# Patient Record
Sex: FEMALE | Race: BLACK OR AFRICAN AMERICAN | NOT HISPANIC OR LATINO | Employment: PART TIME | URBAN - METROPOLITAN AREA
[De-identification: names, ages, dates, MRNs, and addresses within clinical notes are randomized per-mention and may not be internally consistent; named-entity substitution may affect disease eponyms.]

---

## 2017-06-08 ENCOUNTER — OFFICE VISIT (OUTPATIENT)
Dept: FAMILY MEDICINE | Facility: CLINIC | Age: 42
End: 2017-06-08
Payer: COMMERCIAL

## 2017-06-08 VITALS — TEMPERATURE: 98.5 F | DIASTOLIC BLOOD PRESSURE: 69 MMHG | HEART RATE: 80 BPM | SYSTOLIC BLOOD PRESSURE: 103 MMHG

## 2017-06-08 DIAGNOSIS — Z71.84 TRAVEL ADVICE ENCOUNTER: Primary | ICD-10-CM

## 2017-06-08 DIAGNOSIS — Z23 NEED FOR VACCINATION: ICD-10-CM

## 2017-06-08 PROCEDURE — 99402 PREV MED CNSL INDIV APPRX 30: CPT | Mod: 25 | Performed by: NURSE PRACTITIONER

## 2017-06-08 PROCEDURE — 90471 IMMUNIZATION ADMIN: CPT | Mod: GA | Performed by: NURSE PRACTITIONER

## 2017-06-08 PROCEDURE — 90691 TYPHOID VACCINE IM: CPT | Mod: GA | Performed by: NURSE PRACTITIONER

## 2017-06-08 RX ORDER — AZITHROMYCIN 500 MG/1
500 TABLET, FILM COATED ORAL DAILY
Qty: 3 TABLET | Refills: 0 | Status: SHIPPED | OUTPATIENT
Start: 2017-06-08 | End: 2017-06-11

## 2017-06-08 RX ORDER — ATOVAQUONE AND PROGUANIL HYDROCHLORIDE 250; 100 MG/1; MG/1
1 TABLET, FILM COATED ORAL DAILY
Qty: 80 TABLET | Refills: 0 | Status: SHIPPED | OUTPATIENT
Start: 2017-06-08 | End: 2017-10-11

## 2017-06-08 NOTE — MR AVS SNAPSHOT
"              After Visit Summary   6/8/2017    Nila Agarwal    MRN: [id number]           Patient Information     Date Of Birth          1975        Visit Information        Provider Department      6/8/2017 2:00 PM Liliana Layton APRN CNP [facility]        Todays Diagnoses     Travel advice encounter    -  1    Need for vaccination          Care Instructions    Today June 8, 2017 you received the    Typhoid - injectable. This vaccine is valid for two years.   .    These appointments can be made as a NURSE ONLY visit.    **It is very important for the vaccinations to be given on the scheduled day(s), this helps ensure you receive the full effectiveness of the vaccine.**    Please call [facility] with any questions [phone]    Thank you for visiting [facility]              Follow-ups after your visit        Who to contact     If you have questions or need follow up information about today's clinic visit or your schedule please contact [facility] directly at [phone].  Normal or non-critical lab and imaging results will be communicated to you by Snapversehart, letter or phone within 4 business days after the clinic has received the results. If you do not hear from us within 7 days, please contact the clinic through Snapversehart or phone. If you have a critical or abnormal lab result, we will notify you by phone as soon as possible.  Submit refill requests through ImageBrief or call your pharmacy and they will forward the refill request to us. Please allow 3 business days for your refill to be completed.          Additional Information About Your Visit        Snapversehart Information     ImageBrief lets you send messages to your doctor, view your test results, renew your prescriptions, schedule appointments and more. To sign up, go to [email or web address] . Click on \"Log in\" on the left side of the screen, which will take you to the Welcome page. " "Then click on \"Sign up Now\" on the right side of the page.     You will be asked to enter the access code listed below, as well as some personal information. Please follow the directions to create your username and password.     Your access code is: [email or web address]  Expires: 2017  2:33 PM     Your access code will  in 90 days. If you need help or a new code, please call your [facility] clinic or [phone].        Care EveryWhere ID     This is your Care EveryWhere ID. This could be used by other organizations to access your [facility] medical records  [id number]        Your Vitals Were     Pulse Temperature Last Period Breastfeeding?          80 98.5  F (36.9  C) (Oral) 2017 (Approximate) No         Blood Pressure from Last 3 Encounters:   17 103/69   16 111/77   11/04/15 94/66    Weight from Last 3 Encounters:   16 131 lb (59.4 kg)   11/04/15 142 lb 3.2 oz (64.5 kg)   06/05/15 144 lb (65.3 kg)              We Performed the Following     TYPHOID VACCINE, IM          Today's Medication Changes          These changes are accurate as of: 17  2:33 PM.  If you have any questions, ask your nurse or doctor.               Start taking these medicines.        Dose/Directions    atovaquone-proguanil 250-100 MG per tablet   Commonly known as:  MALARONE   Used for:  Need for vaccination, Travel advice encounter   Started by:  Liliana Layton APRN CNP        Dose:  1 tablet   Take 1 tablet by mouth daily Start 2 days before exposure to Malaria and continue daily till  7 days after exposure.   Quantity:  80 tablet   Refills:  0       azithromycin 500 MG tablet   Commonly known as:  ZITHROMAX   Used for:  Travel advice encounter   Started by:  Liliana Layton APRN CNP        Dose:  500 mg   Take 1 tablet (500 mg) by mouth daily for 3 doses Take 1 tablet a day for up to 3 days for severe diarrhea   Quantity:  3 tablet   Refills:  0            Where to get your medicines      These " medications were sent to [id number] - [place], [place] - [address] [facility] [address] [facility]  [address] [facility] [address]     Phone:  [phone]     atovaquone-proguanil 250-100 MG per tablet    azithromycin 500 MG tablet                Primary Care Provider Office Phone # Fax #    Hailey Amparo Barragan [phone]       [place] [address] [facility] [address]        Thank you!     Thank you for choosing [facility]  for your care. Our goal is always to provide you with excellent care. Hearing back from our patients is one way we can continue to improve our services. Please take a few minutes to complete the written survey that you may receive in the mail after your visit with us. Thank you!             Your Updated Medication List - Protect others around you: Learn how to safely use, store and throw away your medicines at www.disposemymeds.org.          This list is accurate as of: 6/8/17  2:33 PM.  Always use your most recent med list.                   Brand Name Dispense Instructions for use    atovaquone-proguanil 250-100 MG per tablet    MALARONE    80 tablet    Take 1 tablet by mouth daily Start 2 days before exposure to Malaria and continue daily till  7 days after exposure.       azithromycin 500 MG tablet    ZITHROMAX    3 tablet    Take 1 tablet (500 mg) by mouth daily for 3 doses Take 1 tablet a day for up to 3 days for severe diarrhea       cetirizine 10 MG tablet    zyrTEC    30 tablet    Take 1 tablet (10 mg) by mouth every evening       fluticasone 50 MCG/ACT spray    FLONASE    16 g    Spray 1-2 sprays into both nostrils daily       ibuprofen 200 MG capsule     120 capsule    Take 400 mg by mouth every 6 hours as needed for pain or fever.       multivitamin, therapeutic with minerals Tabs tablet     30 tablet    Take 1 tablet by mouth daily       vitamin D 2000 UNITS tablet     30 tablet    Take 2,000 Units  by mouth daily

## 2017-06-08 NOTE — PROGRESS NOTES
Nurse Note      Itinerary:  [facility]       Departure Date: 06/16/2017      Return Date: 08/24/2017      Length of Trip 2 months 14 days      Reason for Travel: Vacation            Urban or rural: urban      Accommodations: Family home        IMMUNIZATION HISTORY  Have you received any immunizations within the past 4 weeks?  No  Have you ever fainted from having your blood drawn or from an injection?  No  Have you ever had a fever reaction to vaccination?  No  Have you ever had any bad reaction or side effect from any vaccination?  No  Have you ever had hepatitis A or B vaccine?  Yes  Do you live (or work closely) with anyone who has AIDS, an AIDS-like condition, any other immune disorder or who is on chemotherapy for cancer?  No  Do you have a family history of immunodeficiency?  No  Have you received any injection of immune globulin or any blood products during the past 12 months?  No    Patient roomed by CHARLOTTE Klein  Nila Agarwal is a 42 year old female seen today with child for counsultation for international travel to [facility] for Visiting friends and relatives.  Patient will be departing in  1 week(s) and staying for   2 month(s) and  traveling with child(don).      Patient itinerary :  will be in the [facility] [place] [facility] which presents risk for Malaria, Dengue Fever, Chikungungya, Zika,  Trypanosomiasis, Schistosomiasis, Rabies, food borne illnesses, motor vehicle accidents, Typhoid, Leishmaniasis and Lassa Fever. exposure.      Patient's activities will include visiting friends and relatives.    Patient's country of birth is [facility]    Special medical concerns: none  Pre-travel questionnaire was completed by patient and reviewed by provider.     Vitals: /69  Pulse 80  Temp 98.5  F (36.9  C) (Oral)  LMP 06/01/2017 (Approximate)  Breastfeeding? No  BMI= There is no height or weight on file to calculate BMI.    EXAM:  General:  Well-nourished, well-developed in no  acute distress.  Appears to be stated age, interacts appropriately and expresses understanding of information given to patient.    Current Outpatient Prescriptions   Medication Sig Dispense Refill     atovaquone-proguanil (MALARONE) 250-100 MG per tablet Take 1 tablet by mouth daily Start 2 days before exposure to Malaria and continue daily till  7 days after exposure. 80 tablet 0     fluticasone (FLONASE) 50 MCG/ACT nasal spray Spray 1-2 sprays into both nostrils daily 16 g 0     multivitamin, therapeutic with minerals (MULTI-VITAMIN) TABS Take 1 tablet by mouth daily 30 tablet 0     Cholecalciferol (VITAMIN D) 2000 UNITS tablet Take 2,000 Units by mouth daily 30 tablet 0     cetirizine (ZYRTEC) 10 MG tablet Take 1 tablet (10 mg) by mouth every evening 30 tablet 2     ibuprofen 200 MG capsule Take 400 mg by mouth every 6 hours as needed for pain or fever. 120 capsule 2     Patient Active Problem List   Diagnosis     CARDIOVASCULAR SCREENING; LDL GOAL LESS THAN 160     Chronic rhinitis     External hemorrhoids with other complication     Fatigue     Vitamin D deficiency     Thyroid nodule     Neutropenia (H)     Allergies   Allergen Reactions     No Known Allergies      Seasonal Allergies          Immunizations discussed include:   Hepatitis A:  Declined  Not concerned about risk of disease  Hepatitis B: Up to date  Influenza: Declined  Not concerned about risk of disease  Typhoid: Ordered/given today, risks, benefits and side effects reviewed  Rabies: Insufficient time to vaccinate  Yellow Fever: Not indicated  Japanese Encephalitis: Not indicated  Meningococcus: Not indicated  Tetanus/Diphtheria: Up to date  Measles/Mumps/Rubella: Up to date  Cholera: Not needed  Polio: Up to date  Pneumococcal: Under age of 65  Varicella: Up to date  Zostavax:  Not indicated  HPV:  Not indicated  TB:  Low risk     Altitude Exposure on this trip: no    ASSESSMENT/PLAN:    ICD-10-CM    1. Travel advice encounter Z71.89 TYPHOID  VACCINE, IM     azithromycin (ZITHROMAX) 500 MG tablet     atovaquone-proguanil (MALARONE) 250-100 MG per tablet   2. Need for vaccination Z23 TYPHOID VACCINE, IM     atovaquone-proguanil (MALARONE) 250-100 MG per tablet     I have reviewed general recommendations for safe travel   including: food/water precautions, insect precautions, safer sex   practices given high prevalence of Zika, HIV and other STDs,   roadway safety. Educational materials and Travax report provided.    Malaraia prophylaxis recommended: Malarone  Symptomatic treatment for traveler's diarrhea: azithromycin  Altitude illness prevention and treatment: no      Evacuation insurance advised and resources were provided to patient.    Total visit time 30 minutes  with over 50% of time spent counseling patient as detailed above.    Liliana Layton CNP

## 2017-06-08 NOTE — PATIENT INSTRUCTIONS
Today June 8, 2017 you received the    Typhoid - injectable. This vaccine is valid for two years.   .    These appointments can be made as a NURSE ONLY visit.    **It is very important for the vaccinations to be given on the scheduled day(s), this helps ensure you receive the full effectiveness of the vaccine.**    Please call [facility] with any questions [phone]    Thank you for visiting [facility]

## 2017-10-11 ENCOUNTER — OFFICE VISIT (OUTPATIENT)
Dept: FAMILY MEDICINE | Facility: CLINIC | Age: 42
End: 2017-10-11
Payer: COMMERCIAL

## 2017-10-11 VITALS
RESPIRATION RATE: 16 BRPM | WEIGHT: 135 LBS | SYSTOLIC BLOOD PRESSURE: 110 MMHG | BODY MASS INDEX: 21.79 KG/M2 | HEART RATE: 74 BPM | OXYGEN SATURATION: 100 % | TEMPERATURE: 98.9 F | DIASTOLIC BLOOD PRESSURE: 68 MMHG

## 2017-10-11 DIAGNOSIS — J30.1 CHRONIC SEASONAL ALLERGIC RHINITIS DUE TO POLLEN: ICD-10-CM

## 2017-10-11 PROCEDURE — 99213 OFFICE O/P EST LOW 20 MIN: CPT | Performed by: FAMILY MEDICINE

## 2017-10-11 RX ORDER — FLUTICASONE PROPIONATE 50 MCG
1-2 SPRAY, SUSPENSION (ML) NASAL DAILY
Qty: 16 G | Refills: 3 | Status: SHIPPED | OUTPATIENT
Start: 2017-10-11 | End: 2019-04-10

## 2017-10-11 NOTE — NURSING NOTE
"Chief Complaint   Patient presents with     Allergies       Initial /68  Pulse 74  Temp 98.9  F (37.2  C) (Tympanic)  Resp 16  Wt 135 lb (61.2 kg)  SpO2 100%  BMI 21.79 kg/m2 Estimated body mass index is 21.79 kg/(m^2) as calculated from the following:    Height as of 6/13/16: 5' 6\" (1.676 m).    Weight as of this encounter: 135 lb (61.2 kg).  Medication Reconciliation: complete   .Meagan PORTER      "

## 2017-10-11 NOTE — MR AVS SNAPSHOT
"              After Visit Summary   10/11/2017    Nila Agarwal    MRN: [id number]           Patient Information     Date Of Birth          1975        Visit Information        Provider Department      10/11/2017 11:20 AM Hailey Barragan MD [facility]        Today's Diagnoses     Chronic seasonal allergic rhinitis due to pollen           Follow-ups after your visit        Who to contact     If you have questions or need follow up information about today's clinic visit or your schedule please contact [facility] directly at [phone].  Normal or non-critical lab and imaging results will be communicated to you by 121casthart, letter or phone within 4 business days after the clinic has received the results. If you do not hear from us within 7 days, please contact the clinic through 121casthart or phone. If you have a critical or abnormal lab result, we will notify you by phone as soon as possible.  Submit refill requests through DianDian or call your pharmacy and they will forward the refill request to us. Please allow 3 business days for your refill to be completed.          Additional Information About Your Visit        MyChart Information     DianDian lets you send messages to your doctor, view your test results, renew your prescriptions, schedule appointments and more. To sign up, go to [email or web address] . Click on \"Log in\" on the left side of the screen, which will take you to the Welcome page. Then click on \"Sign up Now\" on the right side of the page.     You will be asked to enter the access code listed below, as well as some personal information. Please follow the directions to create your username and password.     Your access code is: [email or web address]  Expires: 1/15/2018 12:28 PM     Your access code will  in 90 days. If you need help or a new code, please call your [facility] or [phone].        Care EveryWhere ID     " This is your Care EveryWhere ID. This could be used by other organizations to access your [facility] medical records  [id number]        Your Vitals Were     Pulse Temperature Respirations Pulse Oximetry BMI (Body Mass Index)       74 98.9  F (37.2  C) (Tympanic) 16 100% 21.79 kg/m2        Blood Pressure from Last 3 Encounters:   10/17/17 104/62   10/11/17 110/68   06/08/17 103/69    Weight from Last 3 Encounters:   10/17/17 140 lb (63.5 kg)   10/11/17 135 lb (61.2 kg)   06/13/16 131 lb (59.4 kg)              Today, you had the following     No orders found for display         Today's Medication Changes          These changes are accurate as of: 10/11/17 11:59 PM.  If you have any questions, ask your nurse or doctor.               Stop taking these medicines if you haven't already. Please contact your care team if you have questions.     atovaquone-proguanil 250-100 MG per tablet   Commonly known as:  MALARONE   Stopped by:  Hailey Barragan MD           cetirizine 10 MG tablet   Commonly known as:  zyrTEC   Stopped by:  Hailey Barragan MD           ibuprofen 200 MG capsule   Stopped by:  Hailey Barragan MD           vitamin D 2000 UNITS tablet   Stopped by:  Hailey Barragan MD                Where to get your medicines      These medications were sent to [facility] [address] [place] [address] [address] [facility] [address] [place] [address] [address] [facility] [address]     Phone:  [phone]     fluticasone 50 MCG/ACT spray                Primary Care Provider Office Phone # Fax #    Hailey Barragan [phone] [address] [facility] [address]        Equal Access to Services     VANDANA RENEE AH: Hadii ting Salcedo, wasiennada luqadaha, qaybta kaalmada adeegyada, bruce estrella. So [facility] [phone].    ATENCIÓN: Si habla español, tiene a alcantara disposición servicios gratuitos de asistencia lingüística. Llame al  [phone].    We comply with applicable federal civil rights laws and [place] laws. We do not discriminate on the basis of race, color, national origin, age, disability, sex, sexual orientation, or gender identity.            Thank you!     Thank you for choosing [facility]  for your care. Our goal is always to provide you with excellent care. Hearing back from our patients is one way we can continue to improve our services. Please take a few minutes to complete the written survey that you may receive in the mail after your visit with us. Thank you!             Your Updated Medication List - Protect others around you: Learn how to safely use, store and throw away your medicines at www.disposemymeds.org.          This list is accurate as of: 10/11/17 11:59 PM.  Always use your most recent med list.                   Brand Name Dispense Instructions for use Diagnosis    fluticasone 50 MCG/ACT spray    FLONASE    16 g    Spray 1-2 sprays into both nostrils daily    Chronic seasonal allergic rhinitis due to pollen       multivitamin, therapeutic with minerals Tabs tablet     30 tablet    Take 1 tablet by mouth daily    Fatigue

## 2017-10-17 ENCOUNTER — OFFICE VISIT (OUTPATIENT)
Dept: FAMILY MEDICINE | Facility: CLINIC | Age: 42
End: 2017-10-17
Payer: COMMERCIAL

## 2017-10-17 VITALS
HEIGHT: 64 IN | TEMPERATURE: 98.6 F | OXYGEN SATURATION: 100 % | SYSTOLIC BLOOD PRESSURE: 104 MMHG | HEART RATE: 67 BPM | RESPIRATION RATE: 16 BRPM | DIASTOLIC BLOOD PRESSURE: 62 MMHG | WEIGHT: 140 LBS | BODY MASS INDEX: 23.9 KG/M2

## 2017-10-17 DIAGNOSIS — Z00.00 ROUTINE GENERAL MEDICAL EXAMINATION AT A HEALTH CARE FACILITY: Primary | ICD-10-CM

## 2017-10-17 PROCEDURE — 87624 HPV HI-RISK TYP POOLED RSLT: CPT | Performed by: FAMILY MEDICINE

## 2017-10-17 PROCEDURE — 99396 PREV VISIT EST AGE 40-64: CPT | Performed by: FAMILY MEDICINE

## 2017-10-17 PROCEDURE — G0145 SCR C/V CYTO,THINLAYER,RESCR: HCPCS | Performed by: FAMILY MEDICINE

## 2017-10-17 NOTE — PROGRESS NOTES
SUBJECTIVE:   CC: Nila Agarwal is an 42 year old woman who presents for preventive health visit.     Physical   Annual:     Getting at least 3 servings of Calcium per day::  Yes    Bi-annual eye exam::  Yes    Dental care twice a year::  Yes    Sleep apnea or symptoms of sleep apnea::  None    Diet::  Regular (no restrictions)    Frequency of exercise::  2-3 days/week    Duration of exercise::  15-30 minutes    Taking medications regularly::  Yes    Medication side effects::  None    Additional concerns today::  No            Today's PHQ-2 Score:   PHQ-2 ( 1999 Pfizer) 10/17/2017   Q1: Little interest or pleasure in doing things 0   Q2: Feeling down, depressed or hopeless 0   PHQ-2 Score 0   Q1: Little interest or pleasure in doing things Not at all   Q2: Feeling down, depressed or hopeless Not at all   PHQ-2 Score 0       Abuse: Current or Past(Physical, Sexual or Emotional)- No  Do you feel safe in your environment - Yes    Social History   Substance Use Topics     Smoking status: Never Smoker     Smokeless tobacco: Never Used     Alcohol use No     none    Reviewed orders with patient.  Reviewed health maintenance and updated orders accordingly - Yes      Patient under age 50, mutual decision reflected in health maintenance.        Pertinent mammograms are reviewed under the imaging tab.  History of abnormal Pap smear:   Last 3 Pap Results:   PAP (no units)   Date Value   10/09/2014 NIL   11/29/2010 NIL       Reviewed and updated as needed this visit by clinical staff  Tobacco  Allergies  Med Hx  Surg Hx  Fam Hx  Soc Hx        Reviewed and updated as needed this visit by Provider              ROS:  C: NEGATIVE for fever, chills, change in weight  I: NEGATIVE for worrisome rashes, moles or lesions  E: NEGATIVE for vision changes or irritation  ENT: NEGATIVE for ear, mouth and throat problems  R: NEGATIVE for significant cough or SOB  B: NEGATIVE for masses, tenderness or discharge  CV: NEGATIVE for  "chest pain, palpitations or peripheral edema  GI: NEGATIVE for nausea, abdominal pain, heartburn, or change in bowel habits  : NEGATIVE for unusual urinary or vaginal symptoms. Periods are regular.  M: NEGATIVE for significant arthralgias or myalgia  N: NEGATIVE for weakness, dizziness or paresthesias  P: NEGATIVE for changes in mood or affect     OBJECTIVE:   /62  Pulse 67  Temp 98.6  F (37  C) (Tympanic)  Resp 16  Ht 5' 4.25\" (1.632 m)  Wt 140 lb (63.5 kg)  LMP 09/23/2017 (Approximate)  SpO2 100%  BMI 23.84 kg/m2  EXAM:  GENERAL: healthy, alert and no distress  EYES: Eyes grossly normal to inspection, PERRL and conjunctivae and sclerae normal  HENT: ear canals and TM's normal, nose and mouth without ulcers or lesions  NECK: no adenopathy, no asymmetry, masses, or scars and thyroid normal to palpation  RESP: lungs clear to auscultation - no rales, rhonchi or wheezes  BREAST: normal without masses, tenderness or nipple discharge and no palpable axillary masses or adenopathy  CV: regular rate and rhythm, normal S1 S2, no S3 or S4, no murmur, click or rub, no peripheral edema and peripheral pulses strong  ABDOMEN: soft, nontender, no hepatosplenomegaly, no masses and bowel sounds normal   (female): normal female external genitalia, normal urethral meatus, vaginal mucosa pink, moist, well rugated, and normal cervix/adnexa/uterus without masses or discharge  MS: no gross musculoskeletal defects noted, no edema  SKIN: no suspicious lesions or rashes  NEURO: Normal strength and tone, mentation intact and speech normal  PSYCH: mentation appears normal, affect normal/bright    ASSESSMENT/PLAN:       ICD-10-CM    1. Routine general medical examination at a health care facility Z00.00 Pap imaged thin layer screen with HPV - recommended age 30 - 65 years (select HPV order below)     HPV High Risk Types DNA Cervical       COUNSELING:  Reviewed preventive health counseling, as reflected in patient " "instructions         reports that she has never smoked. She has never used smokeless tobacco.    Estimated body mass index is 23.84 kg/(m^2) as calculated from the following:    Height as of this encounter: 5' 4.25\" (1.632 m).    Weight as of this encounter: 140 lb (63.5 kg).         Counseling Resources:  ATP IV Guidelines  Pooled Cohorts Equation Calculator  Breast Cancer Risk Calculator  FRAX Risk Assessment  ICSI Preventive Guidelines  Dietary Guidelines for Americans, 2010  USDA's MyPlate  ASA Prophylaxis  Lung CA Screening    Hailey Barragan MD  [facility] Center[facility]swers for HPI/ROS submitted by the patient on 10/17/2017   PHQ-2 Score: 0    "

## 2017-10-17 NOTE — NURSING NOTE
"Chief Complaint   Patient presents with     Physical       Initial /62  Pulse 67  Temp 98.6  F (37  C) (Tympanic)  Resp 16  Ht 5' 4.25\" (1.632 m)  Wt 140 lb (63.5 kg)  LMP 09/23/2017 (Approximate)  SpO2 100%  BMI 23.84 kg/m2 Estimated body mass index is 23.84 kg/(m^2) as calculated from the following:    Height as of this encounter: 5' 4.25\" (1.632 m).    Weight as of this encounter: 140 lb (63.5 kg).  Medication Reconciliation: complete   .Meagan PORTER      "

## 2017-10-17 NOTE — LETTER
October 26, 2017    Nila Agarwal  [address]  [place] [address]    Dear Nila,  We are happy to inform you that your PAP smear result from 10/17/17 is normal.  We are now able to do a follow up test on PAP smears. The DNA test is for HPV (Human Papilloma Virus). Cervical cancer is closely linked with certain types of HPV. Your result showed no evidence of high risk HPV.  Therefore we recommend you return in 3 years for your next pap smear.  You will still need to return to the clinic every year for an annual exam and other preventive tests.  Please contact the clinic at [phone] with any questions.  Sincerely,    Hailey Barragan MD/[facility]

## 2017-10-20 LAB
COPATH REPORT: NORMAL
PAP: NORMAL

## 2017-10-24 LAB
FINAL DIAGNOSIS: NORMAL
HPV HR 12 DNA CVX QL NAA+PROBE: NEGATIVE
HPV16 DNA SPEC QL NAA+PROBE: NEGATIVE
HPV18 DNA SPEC QL NAA+PROBE: NEGATIVE
SPECIMEN DESCRIPTION: NORMAL

## 2017-11-30 ENCOUNTER — TRANSFERRED RECORDS (OUTPATIENT)
Dept: HEALTH INFORMATION MANAGEMENT | Facility: CLINIC | Age: 42
End: 2017-11-30

## 2018-09-12 ENCOUNTER — OFFICE VISIT (OUTPATIENT)
Dept: FAMILY MEDICINE | Facility: CLINIC | Age: 43
End: 2018-09-12
Payer: COMMERCIAL

## 2018-09-12 VITALS
HEART RATE: 68 BPM | SYSTOLIC BLOOD PRESSURE: 108 MMHG | WEIGHT: 137 LBS | BODY MASS INDEX: 23.33 KG/M2 | TEMPERATURE: 98.7 F | DIASTOLIC BLOOD PRESSURE: 62 MMHG | OXYGEN SATURATION: 100 %

## 2018-09-12 DIAGNOSIS — J02.0 STREP THROAT: ICD-10-CM

## 2018-09-12 DIAGNOSIS — D70.9 NEUTROPENIA, UNSPECIFIED TYPE (H): ICD-10-CM

## 2018-09-12 DIAGNOSIS — E56.9 VITAMIN DEFICIENCY: ICD-10-CM

## 2018-09-12 DIAGNOSIS — R07.0 THROAT PAIN: Primary | ICD-10-CM

## 2018-09-12 LAB
DEPRECATED S PYO AG THROAT QL EIA: ABNORMAL
SPECIMEN SOURCE: ABNORMAL

## 2018-09-12 PROCEDURE — 99213 OFFICE O/P EST LOW 20 MIN: CPT | Performed by: FAMILY MEDICINE

## 2018-09-12 PROCEDURE — 87880 STREP A ASSAY W/OPTIC: CPT | Performed by: FAMILY MEDICINE

## 2018-09-12 RX ORDER — PENICILLIN V POTASSIUM 500 MG/1
500 TABLET, FILM COATED ORAL 2 TIMES DAILY
Qty: 20 TABLET | Refills: 0 | Status: SHIPPED | OUTPATIENT
Start: 2018-09-12 | End: 2019-04-10

## 2018-09-12 NOTE — PROGRESS NOTES
SUBJECTIVE:   Nila Agarwal is a 43 year old female who presents to clinic today for the following health issues:      RESPIRATORY SYMPTOMS      Duration: 3-4 days    Description  rhinorrhea, sore throat and fatigue    Severity: moderate    Accompanying signs and symptoms: None    History (predisposing factors):  none    Precipitating or alleviating factors: None    Therapies tried and outcome:  none    SUBJECTIVE:                  Nila Agarwal is a 43 year old female patient, here alone, in today for:    Sore Throat:     Onset: - 2 days      Fever no    Chills/Sweats: no    Headache (location?):  no    Sinus Pressure: no    Conjunctivitis:  no    Ear Pain:  no    Rhinorrhea:  no    Congestion:  no    Sore Throat:  YES   Cough:  no    Wheeze:  no    Nausea:  no    Vomiting:  no    Diarrhea:  no    Fatigue/Achiness: YES    Sick/Strep Exposure:  YES   Therapies tried: time  Outcome: no relief    Problem list and histories reviewed & adjusted, as indicated.    Additional History: daughter just dx with strep    ROS:  5-Point Review of Systems Negative -- Except as noted above.    OBJECTIVE:                     /62 (Cuff Size: Adult Regular)  Pulse 68  Temp 98.7  F (37.1  C) (Tympanic)  Wt 137 lb (62.1 kg)  SpO2 100%  BMI 23.33 kg/m2 Body mass index is 23.33 kg/(m^2).     Appears moderately ill but in no apparent distress. No significant cough.  TM -- no acute infection or effusion  OP: significant erythema: present; exudate: absent.    Neck:  Supple. Anterior adenopathy: None Noted  Lungs:  Clear   Cardiac:  Regular.  No murmur.   Abdomen: soft and non tender without hepatosplenomegaly.  No rash.  Rapid Strep: positive  ASSESSMENT/PLAN:                         ICD-10-CM    1. Throat pain R07.0 Rapid strep screen   2. Strep throat J02.0 penicillin V potassium (VEETID) 500 MG tablet   3. Vitamin deficiency E56.9 cholecalciferol (VITAMIN D3) 1000 UNIT tablet   4. Neutropenia, unspecified type (H)  D70.9     Stable, check CBC yearly     Throw away toothbrushes, sterilize waterbottles, no work x 24 hours on medication.  Return to clinic or call if symptoms not improved in 48 hours.    Symptomatic cares and fever control(if indicated) discussed. Risks and benefits of meds discussed.  Dr. Hailey Barragan MD/[facility]

## 2018-09-12 NOTE — PROGRESS NOTES
Labs were reviewed with patient in office; see my office visit note for details.   Hailey Barragan MD

## 2018-09-12 NOTE — MR AVS SNAPSHOT
After Visit Summary   9/12/2018    Nila Agarwal    MRN: [id number]           Patient Information     Date Of Birth          1975        Visit Information        Provider Department      9/12/2018 11:00 AM Hailey Barragan MD [facility]        Today's Diagnoses     Throat pain    -  1    Strep throat        Vitamin deficiency        Neutropenia, unspecified type (H)           Follow-ups after your visit        Who to contact     If you have questions or need follow up information about today's clinic visit or your schedule please contact [facility] directly at [phone].  Normal or non-critical lab and imaging results will be communicated to you by MyChart, letter or phone within 4 business days after the clinic has received the results. If you do not hear from us within 7 days, please contact the clinic through MyChart or phone. If you have a critical or abnormal lab result, we will notify you by phone as soon as possible.  Submit refill requests through [employer] or call your pharmacy and they will forward the refill request to us. Please allow 3 business days for your refill to be completed.          Additional Information About Your Visit        Care EveryWhere ID     This is your Care EveryWhere ID. This could be used by other organizations to access your [place] medical records  [id number]        Your Vitals Were     Pulse Temperature Pulse Oximetry BMI (Body Mass Index)          68 98.7  F (37.1  C) (Tympanic) 100% 23.33 kg/m2         Blood Pressure from Last 3 Encounters:   09/12/18 108/62   10/17/17 104/62   10/11/17 110/68    Weight from Last 3 Encounters:   09/12/18 137 lb (62.1 kg)   10/17/17 140 lb (63.5 kg)   10/11/17 135 lb (61.2 kg)              We Performed the Following     Rapid strep screen          Today's Medication Changes          These changes are accurate as of 9/12/18 11:42 AM.  If you have  any questions, ask your nurse or doctor.               Start taking these medicines.        Dose/Directions    cholecalciferol 1000 UNIT tablet   Commonly known as:  vitamin D3   Used for:  Vitamin deficiency   Started by:  Hailey Barragan MD        Dose:  1000 Units   Take 1 tablet (1,000 Units) by mouth daily   Quantity:  100 tablet   Refills:  3       penicillin V potassium 500 MG tablet   Commonly known as:  VEETID   Used for:  Strep throat   Started by:  Hailey Barragan MD        Dose:  500 mg   Take 1 tablet (500 mg) by mouth 2 times daily   Quantity:  20 tablet   Refills:  0            Where to get your medicines      These medications were sent to [facility] [id number] [place] [address] [address] [facility] & [address] [address]     Phone:  [phone]     cholecalciferol 1000 UNIT tablet    penicillin V potassium 500 MG tablet                Primary Care Provider Office Phone # Fax #    Hailey Barragan [phone] [address] [facility] [address]        Equal Access to Services     LEMUEL RENEE AH: Hadii ting ku hadasho Soomaali, waaxda luqadaha, qaybta kaalmada adeegyada, waxay ejin hayamanda tanner . So [facility] [phone].    ATENCIÓN: Si habla español, tiene a alcantara disposición servicios gratuitos de asistencia lingüística. Ll[facility] [phone].    We comply with applicable federal civil rights laws and [place] laws. We do not discriminate on the basis of race, color, national origin, age, disability, sex, sexual orientation, or gender identity.            Thank you!     Thank you for choosing [facility] [place]  for your care. Our goal is always to provide you with excellent care. Hearing back from our patients is one way we can continue to improve our services. Please take a few minutes to complete the written survey that you may receive in the mail after your visit with us.  Thank you!             Your Updated Medication List - Protect others around you: Learn how to safely use, store and throw away your medicines at www.disposemymeds.org.          This list is accurate as of 9/12/18 11:42 AM.  Always use your most recent med list.                   Brand Name Dispense Instructions for use Diagnosis    cholecalciferol 1000 UNIT tablet    vitamin D3    100 tablet    Take 1 tablet (1,000 Units) by mouth daily    Vitamin deficiency       fluticasone 50 MCG/ACT spray    FLONASE    16 g    Spray 1-2 sprays into both nostrils daily    Chronic seasonal allergic rhinitis due to pollen       multivitamin, therapeutic with minerals Tabs tablet     30 tablet    Take 1 tablet by mouth daily    Fatigue       penicillin V potassium 500 MG tablet    VEETID    20 tablet    Take 1 tablet (500 mg) by mouth 2 times daily    Strep throat

## 2019-04-10 ENCOUNTER — OFFICE VISIT (OUTPATIENT)
Dept: FAMILY MEDICINE | Facility: CLINIC | Age: 44
End: 2019-04-10
Payer: COMMERCIAL

## 2019-04-10 VITALS
HEART RATE: 71 BPM | OXYGEN SATURATION: 100 % | DIASTOLIC BLOOD PRESSURE: 60 MMHG | BODY MASS INDEX: 23.08 KG/M2 | TEMPERATURE: 98.3 F | WEIGHT: 135.5 LBS | SYSTOLIC BLOOD PRESSURE: 110 MMHG

## 2019-04-10 DIAGNOSIS — K04.7 DENTAL INFECTION: ICD-10-CM

## 2019-04-10 DIAGNOSIS — E55.9 VITAMIN D DEFICIENCY: ICD-10-CM

## 2019-04-10 DIAGNOSIS — D70.9 NEUTROPENIA, UNSPECIFIED TYPE (H): Primary | ICD-10-CM

## 2019-04-10 DIAGNOSIS — E04.1 THYROID NODULE: ICD-10-CM

## 2019-04-10 DIAGNOSIS — R53.83 FATIGUE, UNSPECIFIED TYPE: ICD-10-CM

## 2019-04-10 LAB
BASOPHILS # BLD AUTO: 0 10E9/L (ref 0–0.2)
BASOPHILS NFR BLD AUTO: 0.4 %
DIFFERENTIAL METHOD BLD: ABNORMAL
EOSINOPHIL # BLD AUTO: 0.1 10E9/L (ref 0–0.7)
EOSINOPHIL NFR BLD AUTO: 5.1 %
ERYTHROCYTE [DISTWIDTH] IN BLOOD BY AUTOMATED COUNT: 11.5 % (ref 10–15)
HCT VFR BLD AUTO: 40.3 % (ref 35–47)
HGB BLD-MCNC: 13.8 G/DL (ref 11.7–15.7)
LYMPHOCYTES # BLD AUTO: 1.3 10E9/L (ref 0.8–5.3)
LYMPHOCYTES NFR BLD AUTO: 46.4 %
MCH RBC QN AUTO: 31.4 PG (ref 26.5–33)
MCHC RBC AUTO-ENTMCNC: 34.2 G/DL (ref 31.5–36.5)
MCV RBC AUTO: 92 FL (ref 78–100)
MONOCYTES # BLD AUTO: 0.2 10E9/L (ref 0–1.3)
MONOCYTES NFR BLD AUTO: 8.7 %
NEUTROPHILS # BLD AUTO: 1.1 10E9/L (ref 1.6–8.3)
NEUTROPHILS NFR BLD AUTO: 39.4 %
PLATELET # BLD AUTO: 266 10E9/L (ref 150–450)
RBC # BLD AUTO: 4.39 10E12/L (ref 3.8–5.2)
WBC # BLD AUTO: 2.8 10E9/L (ref 4–11)

## 2019-04-10 PROCEDURE — 36415 COLL VENOUS BLD VENIPUNCTURE: CPT | Performed by: FAMILY MEDICINE

## 2019-04-10 PROCEDURE — 84443 ASSAY THYROID STIM HORMONE: CPT | Performed by: FAMILY MEDICINE

## 2019-04-10 PROCEDURE — 99214 OFFICE O/P EST MOD 30 MIN: CPT | Performed by: FAMILY MEDICINE

## 2019-04-10 PROCEDURE — 85025 COMPLETE CBC W/AUTO DIFF WBC: CPT | Performed by: FAMILY MEDICINE

## 2019-04-10 PROCEDURE — 82306 VITAMIN D 25 HYDROXY: CPT | Performed by: FAMILY MEDICINE

## 2019-04-10 NOTE — LETTER
April 11, 2019      Nilajessie Agarwal  [address]  [place] [address]        Dear ,    We are writing to inform you of your test results.    1. Your vitamin D is very, very low.  How much vitamin D are you taking?  I think we need to increase your dose.  This could be causing your fatigue and aching bones.  2. Your blood count is stable.  3. Your thyroid blood test is normal.  I would like you to repeat your thyroid ultrasound test.      Resulted Orders   CBC with platelets and differential   Result Value Ref Range    WBC 2.8 (L) 4.0 - 11.0 10e9/L    RBC Count 4.39 3.8 - 5.2 10e12/L    Hemoglobin 13.8 11.7 - 15.7 g/dL    Hematocrit 40.3 35.0 - 47.0 %    MCV 92 78 - 100 fl    MCH 31.4 26.5 - 33.0 pg    MCHC 34.2 31.5 - 36.5 g/dL    RDW 11.5 10.0 - 15.0 %    Platelet Count 266 150 - 450 10e9/L    Diff Method Automated Method     % Neutrophils 39.4 %    % Lymphocytes 46.4 %    % Monocytes 8.7 %    % Eosinophils 5.1 %    % Basophils 0.4 %    Absolute Neutrophil 1.1 (L) 1.6 - 8.3 10e9/L    Absolute Lymphocytes 1.3 0.8 - 5.3 10e9/L    Absolute Monocytes 0.2 0.0 - 1.3 10e9/L    Absolute Eosinophils 0.1 0.0 - 0.7 10e9/L    Absolute Basophils 0.0 0.0 - 0.2 10e9/L   TSH with free T4 reflex   Result Value Ref Range    TSH 1.22 0.40 - 4.00 mU/L   Vitamin D Deficiency   Result Value Ref Range    Vitamin D Deficiency screening 17 (L) 20 - 75 ug/L      Comment:      Season, race, dietary intake, and treatment affect the concentration of   25-hydroxy-Vitamin D. Values may decrease during winter months and increase   during summer months. Values 20-29 ug/L may indicate Vitamin D insufficiency   and values <20 ug/L may indicate Vitamin D deficiency.  Vitamin D determination is routinely performed by an immunoassay specific for   25 hydroxyvitamin D3.  If an individual is on vitamin D2 (ergocalciferol)   supplementation, please specify 25 [place] vitamin D2 and D3 level determination by   LCMSMS test  [id number].         If you have any questions or concerns, please call the clinic at the number listed above.       Sincerely,        Hailey Barragan MD

## 2019-04-10 NOTE — ASSESSMENT & PLAN NOTE
"  Check Vit D today.    Taking 1000 IU daily.    Hasn't been checked since 2015, and now feeling \"bone pain\" and fatigue.  "

## 2019-04-10 NOTE — ASSESSMENT & PLAN NOTE
Based on exam and lymphadenopathy.    Augmentin x 5 days.    See a dentist.    If swelling doesn't resolve and pain doesn't resolve, follow up with me.  May need ENT referral at that point to exclude more worrisome cause of preauricular lymphadenopathy.

## 2019-04-10 NOTE — PROGRESS NOTES
"  SUBJECTIVE:   Nila Agarwal is a 44 year old female who presents to clinic today for the following   health issues:    face      Duration: 1 week    Description (location/character/radiation): left cheek, eat, jaw    Intensity:  moderate    Accompanying signs and symptoms: pain, tender to touch    History (similar episodes/previous evaluation): None    Precipitating or alleviating factors: None    Therapies tried and outcome: None      Harper 44 year old here today for 1 week of pain in left jaw.  Feels small bump there as well.  Hasn't seen dentist in \"years\".  Does brush nightly.      Also, wonders about rechecking her thyroid?  Feeling fatigued, bone pain.  Had nodule noted 2015, was stable on recheck.    Also, history of neutropenia.  Can we recheck?    Additional history: as documented    Reviewed  and updated as needed this visit by clinical staff      Reviewed and updated as needed this visit by Provider  Meds  Problems         ROS:  Constitutional, HEENT, cardiovascular, pulmonary, gi and gu systems are negative, except as otherwise noted.    OBJECTIVE:     /60 (Cuff Size: Adult Regular)   Pulse 71   Temp 98.3  F (36.8  C) (Tympanic)   Wt 61.5 kg (135 lb 8 oz)   SpO2 100%   BMI 23.08 kg/m    Body mass index is 23.08 kg/m .  GENERAL: healthy, alert and no distress  EYES: Eyes grossly normal to inspection, PERRL and conjunctivae and sclerae normal  HENT: left TMJ tender.  Poor dentition.  Pea sized tender lump anterior to left ear.  Otherwise ear canals and TM's normal, nose and mouth without ulcers or lesions  NECK: no adenopathy, no asymmetry, masses, or scars and thyroid normal to palpation  RESP: lungs clear to auscultation - no rales, rhonchi or wheezes  CV: regular rate and rhythm, normal S1 S2, no S3 or S4, no murmur, click or rub, no peripheral edema and peripheral pulses strong  ABDOMEN: soft, nontender, no hepatosplenomegaly, no masses and bowel sounds normal  MS: no gross " musculoskeletal defects noted, no edema    Diagnostic Test Results:  Results for orders placed or performed in visit on 04/10/19 (from the past 24 hour(s))   CBC with platelets and differential   Result Value Ref Range    WBC 2.8 (L) 4.0 - 11.0 10e9/L    RBC Count 4.39 3.8 - 5.2 10e12/L    Hemoglobin 13.8 11.7 - 15.7 g/dL    Hematocrit 40.3 35.0 - 47.0 %    MCV 92 78 - 100 fl    MCH 31.4 26.5 - 33.0 pg    MCHC 34.2 31.5 - 36.5 g/dL    RDW 11.5 10.0 - 15.0 %    Platelet Count 266 150 - 450 10e9/L    Diff Method Automated Method     % Neutrophils 39.4 %    % Lymphocytes 46.4 %    % Monocytes 8.7 %    % Eosinophils 5.1 %    % Basophils 0.4 %    Absolute Neutrophil 1.1 (L) 1.6 - 8.3 10e9/L    Absolute Lymphocytes 1.3 0.8 - 5.3 10e9/L    Absolute Monocytes 0.2 0.0 - 1.3 10e9/L    Absolute Eosinophils 0.1 0.0 - 0.7 10e9/L    Absolute Basophils 0.0 0.0 - 0.2 10e9/L       ASSESSMENT/PLAN:     Problem List Items Addressed This Visit        Medium    Dental infection       Based on exam and lymphadenopathy.    Augmentin x 5 days.    See a dentist.    If swelling doesn't resolve and pain doesn't resolve, follow up with me.  May need ENT referral at that point to exclude more worrisome cause of preauricular lymphadenopathy.         Relevant Medications    amoxicillin-clavulanate (AUGMENTIN) 875-125 MG tablet    Fatigue       Check CBC, TSH, Vit D today.         Relevant Orders    TSH with free T4 reflex (Completed)    Neutropenia (H) - Primary       Has been stable since 2014.  Check yearly.      If other low counts will need to go back to see heme.         Relevant Orders    CBC with platelets and differential (Completed)    US Thyroid    Thyroid nodule       Recheck TSH and thyroid ultrasound today, as patient noting some voice and thyroid symptoms.         Relevant Orders    TSH with free T4 reflex (Completed)    US Thyroid    Vitamin D deficiency       Check Vit D today.    Taking 1000 IU daily.    Hasn't been checked since  "2015, and now feeling \"bone pain\" and fatigue.         Relevant Orders    Vitamin D Deficiency        Discussed with patient, all questions answered, in agreement with this plan, will return or seek further care if not improving or worsening.      Hailey Barragan MD  [facility]        "

## 2019-04-11 LAB
DEPRECATED CALCIDIOL+CALCIFEROL SERPL-MC: 17 UG/L (ref 20–75)
TSH SERPL DL<=0.005 MIU/L-ACNC: 1.22 MU/L (ref 0.4–4)

## 2019-04-11 NOTE — RESULT ENCOUNTER NOTE
Sent letter with this info:  1. Your vitamin D is very, very low.  How much vitamin D are you taking?  I think we need to increase your dose.  This could be causing your fatigue and aching bones.  2. Your blood count is stable.  3. Your thyroid blood test is normal.  I would like you to repeat your thyroid ultrasound test.

## 2019-04-16 ENCOUNTER — TELEPHONE (OUTPATIENT)
Dept: FAMILY MEDICINE | Facility: CLINIC | Age: 44
End: 2019-04-16

## 2019-04-16 ENCOUNTER — HOSPITAL ENCOUNTER (OUTPATIENT)
Dept: ULTRASOUND IMAGING | Facility: CLINIC | Age: 44
Discharge: HOME | End: 2019-04-16
Attending: FAMILY MEDICINE | Admitting: FAMILY MEDICINE
Payer: COMMERCIAL

## 2019-04-16 DIAGNOSIS — E04.1 THYROID NODULE: Primary | ICD-10-CM

## 2019-04-16 DIAGNOSIS — E55.9 VITAMIN D DEFICIENCY: Primary | ICD-10-CM

## 2019-04-16 DIAGNOSIS — E04.1 THYROID NODULE: ICD-10-CM

## 2019-04-16 DIAGNOSIS — D70.9 NEUTROPENIA, UNSPECIFIED TYPE (H): ICD-10-CM

## 2019-04-16 LAB — RADIOLOGIST FLAGS: ABNORMAL

## 2019-04-16 PROCEDURE — 76536 US EXAM OF HEAD AND NECK: CPT

## 2019-04-16 RX ORDER — ERGOCALCIFEROL 1.25 MG/1
50000 CAPSULE, LIQUID FILLED ORAL WEEKLY
Qty: 8 CAPSULE | Refills: 0 | Status: SHIPPED | OUTPATIENT
Start: 2019-04-16 | End: 2019-06-09

## 2019-04-16 NOTE — TELEPHONE ENCOUNTER
[facility] calling this AM stating they have an abnormal ultrasound result they would like to pass on to the provider.     Thyroid ultrasound was completed today noted enlarging thyroid nodule.     FYI, forward to provider so aware of results.

## 2019-04-16 NOTE — TELEPHONE ENCOUNTER
Let's do high dose supplement x 8 weeks.  50,000 IU vit D once a week x 8.  Then can continue MVI with 2000 IU of vit D daily.  Dr. Hailey Barragan MD/[facility]

## 2019-04-16 NOTE — TELEPHONE ENCOUNTER
Reason for Call:  Other     Detailed comments: would like lab results    Phone Number Patient can be reached at: Home number on file [phone] (home)    Best Time: today    Can we leave a detailed message on this number? YES    Call taken on 4/16/2019 at 9:24 AM by YING BLOCK

## 2019-04-16 NOTE — TELEPHONE ENCOUNTER
I called patient with results - concerning thyroid nodule that has grown.  I will order FNA ASAP.  She will call to schedule within the week.    Explained my concern, and next steps for follow up.  Answered all questions.    Please call to schedule thyroid nodule biopsy: For [facility] or [facility] ([id number]) Radiology call [phone]. For [facility] Radiology call [phone].    Dr. Hailey Barragan MD/[facility]

## 2019-04-16 NOTE — TELEPHONE ENCOUNTER
Nila called to talk about her labs and let her know that her Vit D is low.  I asked her how much vitamin D she is taking and she said she is taking an OTC multivitamin with 2000 international unit(s). I let her know I will call back if Dr Barragan wants to change her dose.    She is doing her thyroid ultrasound right now!  I let her know that the thyroid and other labs are stable.    Routing to Dr Barragan regarding VIt D dose. I pended pharmacy.    Shu Bowles RN- Triage [employer]

## 2019-04-26 NOTE — TELEPHONE ENCOUNTER
I talked to patient 10 days ago, and she hasn't had FNA/US yet and needs to have.    Can you call her on Monday and let her know we really think she needs this test? We're wanting to make sure this isn't a serious disease.    If she has questions or doesn't want to do it, I can call her or she could come in so we can discuss.    Thanks much!  Dr. Hailey Barragan MD/[facility]

## 2019-04-30 NOTE — TELEPHONE ENCOUNTER
Patient called back, she was asked if she needs an , but she declined and said that she is able to speak for herself. The message I got from her is that she thinks she already done the test and there is not need to do another one. She did have FNA US biopsy scheduled, but does not think she needs it now.     Attempted to call back with an , but she did not .     Message left on  to call triage back using .

## 2019-04-30 NOTE — TELEPHONE ENCOUNTER
Attempt #2.  Non-detailed message using phone  left for patient to return call.   also left phone number for Cameroonian speaking line.  GUIDO EspositoN, RN  [employer] Triage

## 2019-05-01 NOTE — TELEPHONE ENCOUNTER
"Pt declines to schedule an appointment. \"I already said I'm not interested on having this test\". She would be willing to talk to PCP over the phone but declined appointment.   "

## 2019-05-01 NOTE — TELEPHONE ENCOUNTER
I called patient - no answer.  Will call back later this afternoon.  Dr. Hailey Barragan MD/[facility]    [phone]

## 2019-05-01 NOTE — TELEPHONE ENCOUNTER
"I called Nila - she said \"I don't feel like getting it, I feel fine\".  I explained that we are concerned that nodule could be cancer.  She says \"I don't have cancer, I feel fine\".  I explained that people don't have symptoms with cancer most of the time.    After long discussion she still refuses biopsy, but did agree to come in to see me to discuss further.    Dr. Hailey Barragan MD/[facility]    "

## 2019-05-01 NOTE — TELEPHONE ENCOUNTER
Pt states she doesn't feel she needs US. Writer stressed need to do test to make sure this isn't a serious disease. She denies any thyroid symptoms and insist she doesn't feel the need for US. Pt agreed to talk to PCP. Please advice. Does pt need a phone visit?

## 2019-06-13 ENCOUNTER — ANCILLARY PROCEDURE (OUTPATIENT)
Dept: ULTRASOUND IMAGING | Facility: CLINIC | Age: 44
End: 2019-06-13
Attending: FAMILY MEDICINE
Payer: COMMERCIAL

## 2019-06-13 DIAGNOSIS — E04.1 THYROID NODULE: ICD-10-CM

## 2019-06-13 RX ORDER — LIDOCAINE HYDROCHLORIDE 10 MG/ML
5 INJECTION, SOLUTION INFILTRATION; PERINEURAL ONCE
Status: COMPLETED | OUTPATIENT
Start: 2019-06-13 | End: 2019-06-13

## 2019-06-13 RX ORDER — LIDOCAINE HYDROCHLORIDE 10 MG/ML
5 INJECTION, SOLUTION INFILTRATION; PERINEURAL ONCE
Status: DISCONTINUED | OUTPATIENT
Start: 2019-06-13 | End: 2019-06-13

## 2019-06-13 RX ADMIN — LIDOCAINE HYDROCHLORIDE 5 ML: 10 INJECTION, SOLUTION INFILTRATION; PERINEURAL at 11:02

## 2019-06-14 LAB — COPATH REPORT: NORMAL

## 2019-06-18 ENCOUNTER — TELEPHONE (OUTPATIENT)
Dept: FAMILY MEDICINE | Facility: CLINIC | Age: 44
End: 2019-06-18

## 2019-06-18 NOTE — TELEPHONE ENCOUNTER
Reason for Call:  Request for results:    Name of test or procedure: thyroid imagine    Date of test of procedure: 6/14    Location of the test or procedure: unclear    OK to leave the result message on voice mail or with a family member? YES    Phone number Patient can be reached at:  Home number on file [phone] (home)    Additional comments:     Call taken on 6/18/2019 at 1:39 PM by BEN SANTIAGO

## 2019-06-18 NOTE — TELEPHONE ENCOUNTER
Wonderful news.  Biopsy results are benign.  Can you let patient know?  Follow up growing/changing.  Dr. Hailey Barragan MD/[facility]

## 2019-06-19 NOTE — TELEPHONE ENCOUNTER
Pt was called with test result below. Pt asked if she is Cancer free. Pt was informed results was benign but advised she follow up with provider if any growth or changes.

## 2019-06-19 NOTE — RESULT ENCOUNTER NOTE
See telephone note - patient notified that testing was benign.  If nodule grows should have repeat FNA.  Repeat thyroid ultrasound in 1 year.  Dr. Hailey Barragan MD/[facility]

## 2019-12-06 NOTE — ASSESSMENT & PLAN NOTE
Has been stable since 2014.  Check yearly.      If other low counts will need to go back to see heme.   Past Medical History:   Diagnosis Date    Diabetes mellitus        No past surgical history on file.    Review of patient's allergies indicates:  No Known Allergies    No current facility-administered medications on file prior to encounter.      Current Outpatient Medications on File Prior to Encounter   Medication Sig    cyclobenzaprine (FEXMID) 7.5 MG Tab Take 10 mg by mouth 3 (three) times daily as needed.    gabapentin (NEURONTIN) 600 MG tablet Take 600 mg by mouth nightly.    insulin (BASAGLAR KWIKPEN U-100 INSULIN) glargine 100 units/mL (3mL) SubQ pen Inject 23 Units into the skin every evening.    oxyCODONE-acetaminophen (PERCOCET)  mg per tablet Take 1 tablet by mouth every 4 (four) hours as needed for Pain.    thiamine (VITAMIN B-1) 100 MG tablet Take 100 mg by mouth once daily.    traMADol (ULTRAM) 50 mg tablet Take 50 mg by mouth every 12 (twelve) hours as needed for Pain.    folic acid (FOLVITE) 1 MG tablet Take 1 mg by mouth once daily.    MEN'S MULTI-VITAMIN ORAL Take 1 tablet by mouth once daily.     Family History     Problem Relation (Age of Onset)    Diabetes Mother    No Known Problems Father        Tobacco Use    Smoking status: Current Every Day Smoker     Packs/day: 0.50     Types: Vaping w/o nicotine    Smokeless tobacco: Never Used   Substance and Sexual Activity    Alcohol use: Yes     Alcohol/week: 28.0 standard drinks     Types: 28 Cans of beer per week    Drug use: Yes    Sexual activity: Not on file     Review of Systems   All other systems reviewed and are negative.    Objective:     Vital Signs (Most Recent):  Temp: 97.8 °F (36.6 °C) (12/06/19 0055)  Pulse: 83 (12/06/19 0055)  Resp: 18 (12/06/19 0055)  BP: 123/80 (12/06/19 0055)  SpO2: 98 % (12/06/19 0055) Vital Signs (24h Range):  Temp:  [97.8 °F (36.6 °C)-98.5 °F (36.9 °C)] 97.8 °F (36.6 °C)  Pulse:  [] 83  Resp:  [16-18] 18  SpO2:  [98 %] 98 %  BP: (123-133)/(71-80) 123/80     Weight: 78.5 kg (173 lb)  Body  mass index is 22.82 kg/m².    Physical Exam   Constitutional: He is oriented to person, place, and time. He appears well-developed and well-nourished. No distress.   HENT:   Head: Normocephalic and atraumatic.   Eyes: Right eye exhibits no discharge. Left eye exhibits no discharge.   Neck: Normal range of motion. Neck supple.   Cardiovascular: Normal rate and regular rhythm.   Pulmonary/Chest: Effort normal and breath sounds normal. He has no wheezes.   Abdominal: Soft. Bowel sounds are normal.   Genitourinary:   Genitourinary Comments: No flank tenderness   Musculoskeletal: Normal range of motion. He exhibits no edema.   Neurological: He is alert and oriented to person, place, and time.   Skin: Skin is warm and dry. Capillary refill takes less than 2 seconds. He is not diaphoretic.   See images   Psychiatric: He has a normal mood and affect.   Vitals reviewed.                      Significant Labs:   CBC:   Recent Labs   Lab 12/06/19  0014   WBC 4.11   HGB 13.4*   HCT 39.1*   PLT 59*     CMP:   Recent Labs   Lab 12/06/19  0014      K 3.7      CO2 23   *   BUN <5*   CREATININE 0.7   CALCIUM 8.4*   PROT 7.9   ALBUMIN 3.3*   BILITOT 3.3*   ALKPHOS 239*   AST 92*   ALT 46*   ANIONGAP 10   EGFRNONAA >60.0     All pertinent labs within the past 24 hours have been reviewed.    Significant Imaging: I have reviewed all pertinent imaging results/findings within the past 24 hours.

## 2020-01-27 ENCOUNTER — OFFICE VISIT (OUTPATIENT)
Dept: FAMILY MEDICINE | Facility: CLINIC | Age: 45
End: 2020-01-27
Payer: COMMERCIAL

## 2020-01-27 VITALS
OXYGEN SATURATION: 100 % | RESPIRATION RATE: 16 BRPM | WEIGHT: 131 LBS | HEIGHT: 64 IN | BODY MASS INDEX: 22.36 KG/M2 | HEART RATE: 114 BPM | DIASTOLIC BLOOD PRESSURE: 76 MMHG | SYSTOLIC BLOOD PRESSURE: 120 MMHG

## 2020-01-27 DIAGNOSIS — E55.9 VITAMIN D DEFICIENCY: ICD-10-CM

## 2020-01-27 DIAGNOSIS — D70.8 OTHER NEUTROPENIA (H): ICD-10-CM

## 2020-01-27 DIAGNOSIS — R53.83 FATIGUE, UNSPECIFIED TYPE: Primary | ICD-10-CM

## 2020-01-27 DIAGNOSIS — R00.0 TACHYCARDIA: ICD-10-CM

## 2020-01-27 LAB
BASOPHILS # BLD AUTO: 0 10E9/L (ref 0–0.2)
BASOPHILS NFR BLD AUTO: 0.3 %
CRP SERPL-MCNC: 8 MG/L (ref 0–8)
DIFFERENTIAL METHOD BLD: ABNORMAL
EOSINOPHIL # BLD AUTO: 0.1 10E9/L (ref 0–0.7)
EOSINOPHIL NFR BLD AUTO: 1.7 %
ERYTHROCYTE [DISTWIDTH] IN BLOOD BY AUTOMATED COUNT: 12.5 % (ref 10–15)
ERYTHROCYTE [SEDIMENTATION RATE] IN BLOOD BY WESTERGREN METHOD: 24 MM/H (ref 0–20)
FOLATE SERPL-MCNC: 36.3 NG/ML
HCT VFR BLD AUTO: 40.8 % (ref 35–47)
HGB BLD-MCNC: 13.9 G/DL (ref 11.7–15.7)
LYMPHOCYTES # BLD AUTO: 0.9 10E9/L (ref 0.8–5.3)
LYMPHOCYTES NFR BLD AUTO: 26.3 %
MCH RBC QN AUTO: 31 PG (ref 26.5–33)
MCHC RBC AUTO-ENTMCNC: 34.1 G/DL (ref 31.5–36.5)
MCV RBC AUTO: 91 FL (ref 78–100)
MONOCYTES # BLD AUTO: 0.3 10E9/L (ref 0–1.3)
MONOCYTES NFR BLD AUTO: 8.2 %
NEUTROPHILS # BLD AUTO: 2.3 10E9/L (ref 1.6–8.3)
NEUTROPHILS NFR BLD AUTO: 63.5 %
PLATELET # BLD AUTO: 258 10E9/L (ref 150–450)
RBC # BLD AUTO: 4.48 10E12/L (ref 3.8–5.2)
VIT B12 SERPL-MCNC: 453 PG/ML (ref 193–986)
WBC # BLD AUTO: 3.5 10E9/L (ref 4–11)

## 2020-01-27 PROCEDURE — 99214 OFFICE O/P EST MOD 30 MIN: CPT | Performed by: PHYSICIAN ASSISTANT

## 2020-01-27 PROCEDURE — 86140 C-REACTIVE PROTEIN: CPT | Performed by: PHYSICIAN ASSISTANT

## 2020-01-27 PROCEDURE — 80050 GENERAL HEALTH PANEL: CPT | Performed by: PHYSICIAN ASSISTANT

## 2020-01-27 PROCEDURE — 82746 ASSAY OF FOLIC ACID SERUM: CPT | Performed by: PHYSICIAN ASSISTANT

## 2020-01-27 PROCEDURE — 93000 ELECTROCARDIOGRAM COMPLETE: CPT | Performed by: PHYSICIAN ASSISTANT

## 2020-01-27 PROCEDURE — 36415 COLL VENOUS BLD VENIPUNCTURE: CPT | Performed by: PHYSICIAN ASSISTANT

## 2020-01-27 PROCEDURE — 82728 ASSAY OF FERRITIN: CPT | Performed by: PHYSICIAN ASSISTANT

## 2020-01-27 PROCEDURE — 82607 VITAMIN B-12: CPT | Performed by: PHYSICIAN ASSISTANT

## 2020-01-27 PROCEDURE — 85652 RBC SED RATE AUTOMATED: CPT | Performed by: PHYSICIAN ASSISTANT

## 2020-01-27 RX ORDER — CHOLECALCIFEROL (VITAMIN D3) 50 MCG
1 TABLET ORAL DAILY
Qty: 90 TABLET | Refills: 11 | Status: SHIPPED | OUTPATIENT
Start: 2020-01-27 | End: 2024-09-11

## 2020-01-27 ASSESSMENT — MIFFLIN-ST. JEOR: SCORE: 1238.18

## 2020-01-27 NOTE — PROGRESS NOTES
Subjective     Nila Agarwal is a 45 year old female who presents to clinic today for the following health issues:    HPI   Fatigue      Duration:     Description (location/character/radiation): pt states that she is tired and would just like to have her BP checked    Intensity:  moderate    Accompanying signs and symptoms: none    History (similar episodes/previous evaluation): None    Precipitating or alleviating factors: None    Therapies tried and outcome: None     - Patient of [facility], new to me.  - Patient reports low energy x1 week. Would like to make sure her BP isn't high.  - Denies f/c/s, CP, SOB, HA, congestion, cough, abd pain, n/v/d, peripheral edema, unexplained change in weight, focal numbness or weakness. Denies poor sleep or increased stress.  - Chart review shows fatigue listed on Problem List. Known vitamin D deficiency, patient not taking supplement. Known neutropenia, w/u by heme essentially negative (?IVAN).     Patient Active Problem List   Diagnosis     Chronic rhinitis     External hemorrhoids with other complication     Fatigue     Vitamin D deficiency     Thyroid nodule     Neutropenia (H)     Dental infection     Past Surgical History:   Procedure Laterality Date     no previous surgeries         Social History     Tobacco Use     Smoking status: Never Smoker     Smokeless tobacco: Never Used   Substance Use Topics     Alcohol use: No     Family History   Problem Relation Age of Onset     Family History Negative No family hx of          Current Outpatient Medications   Medication Sig Dispense Refill     vitamin D3 (CHOLECALCIFEROL) 2000 units (50 mcg) tablet Take 1 tablet (2,000 Units) by mouth daily 90 tablet 11       Reviewed and updated as needed this visit by Provider  Tobacco  Allergies  Meds  Problems  Med Hx  Surg Hx  Fam Hx         Review of Systems   ROS COMP: Constitutional, HEENT, cardiovascular, pulmonary, GI, , musculoskeletal, neuro, skin, endocrine and psych  "systems are negative, except as otherwise noted.      Objective    /76   Pulse 114   Resp 16   Ht 1.632 m (5' 4.25\")   Wt 59.4 kg (131 lb)   SpO2 100%   BMI 22.31 kg/m    Body mass index is 22.31 kg/m .  Physical Exam   GENERAL: WDWN, no acute distress  PSYCH: pleasant, cooperative  EYES: no discharge, no injection  HENT:  Normocephalic. Moist mucus membranes.  NECK:  Supple, symmetric  LUNGS:  Clear to auscultation bilaterally without rhonchi, rales, or wheeze. Chest rise symmetric and no tenderness to palpation.  HEART:  Regular tachycardic rate & rhythm. No murmur, gallop, or rub.  EXTREMITIES:  No gross deformities, moves all 4 limbs spontaneously, no peripheral edema  SKIN:  Warm and dry, no rash or suspicious lesions    NEUROLOGIC: alert, sensation grossly intact.    Diagnostic Test Results:       EKG Interpretation:      Symptoms at time of EKG: fatigue   Rhythm: Sinus tachycardia  Rate: Tachycardia  Axis: Normal  Ectopy: None  Conduction: Normal  ST Segments/ T Waves: No acute ischemic changes and Non-specific ST-T wave changes  Q Waves: None  Comparison to prior: No old EKG available    Clinical Impression: sinus tachycardia          Assessment & Plan       ICD-10-CM    1. Fatigue, unspecified type R53.83 TSH with free T4 reflex     Comprehensive metabolic panel     CBC with platelets differential     Ferritin     Erythrocyte sedimentation rate auto     Folate     Vitamin B12     CRP inflammation     EKG 12-lead complete w/read - Clinics   2. Tachycardia R00.0 EKG 12-lead complete w/read - Clinics   3. Vitamin D deficiency E55.9 vitamin D3 (CHOLECALCIFEROL) 2000 units (50 mcg) tablet   4. Other neutropenia (H) D70.8 CBC with platelets differential     - Patient presents with fatigue/low energy x1 week, though chart review shows this is an ongoing issue.  - Patient mildly tachycardic today. EKG consistent with sinus tachycardia, no other concerning changes. Unclear if this is playing a role in " fatigue, does not appear to be chronic rhythm based on chart review.  - Suspect low vitamin D playing a role. Resume daily 2000 international unit(s) supplement, rx sent.  - Will check CBC to ensure neutropenia stable. Will also screen with above labs for other possible source of fatigue.  - Discussed sx that warrant recheck.    25 minutes total spent during this visit, >50% spent in counseling and coordination of patient care.    Return in about 2 weeks (around 2/10/2020), or if symptoms worsen or fail to improve.    Samreen Ugalde PA-C  [facility]

## 2020-01-27 NOTE — LETTER
January 29, 2020      Nila Agarwal  [address]  [facility] [place] [id number]        Dear Nila,    We are writing to inform you of your test results.    - Your metabolic panel shows:  normal electrolytes (sodium, calcium); slightly decreased potassium, between 3.1 and 3.4. Eat a diet high in potassium, (i.e., bananas, potatoes, almonds) to increase this level, this may be contributing to your fatigue. Normal kidney function (creatinine and GFR) normal liver function (AST/ALT) and a normal fasting blood sugar level (<100)  - Your TSH, a screening test for thyroid disease, was normal.  - Your Blood Count Results show a stable White Blood Cell count (no infection) normal Hemoglobin (not anemia), and normal Platelets (affects clotting).  - Your iron levels are normal.  - Your B12 and folate levels are normal.  -  Your Sed Rate is slightly high, which may be due to your decreased potassium.  - Your CRP was normal (no infection or inflammation).    Results for orders placed or performed in visit on 01/27/20   TSH with free T4 reflex     Status: None   Result Value Ref Range    TSH 1.28 0.40 - 4.00 mU/L   Comprehensive metabolic panel     Status: Abnormal   Result Value Ref Range    Sodium 138 133 - 144 mmol/L    Potassium 3.3 (L) 3.4 - 5.3 mmol/L    Chloride 104 94 - 109 mmol/L    Carbon Dioxide 22 20 - 32 mmol/L    Anion Gap 12 3 - 14 mmol/L    Glucose 98 70 - 99 mg/dL    Urea Nitrogen 16 7 - 30 mg/dL    Creatinine 0.66 0.52 - 1.04 mg/dL    GFR Estimate >90 >60 mL/min/[1.73_m2]    GFR Estimate If Black >90 >60 mL/min/[1.73_m2]    Calcium 9.4 8.5 - 10.1 mg/dL    Bilirubin Total 0.6 0.2 - 1.3 mg/dL    Albumin 4.4 3.4 - 5.0 g/dL    Protein Total 8.5 6.8 - 8.8 g/dL    Alkaline Phosphatase 66 40 - 150 U/L    ALT 13 0 - 50 U/L    AST 19 0 - 45 U/L   CBC with platelets differential     Status: Abnormal   Result Value Ref Range    WBC 3.5 (L) 4.0 - 11.0 10e9/L    RBC Count 4.48 3.8 - 5.2 10e12/L    Hemoglobin  13.9 11.7 - 15.7 g/dL    Hematocrit 40.8 35.0 - 47.0 %    MCV 91 78 - 100 fl    MCH 31.0 26.5 - 33.0 pg    MCHC 34.1 31.5 - 36.5 g/dL    RDW 12.5 10.0 - 15.0 %    Platelet Count 258 150 - 450 10e9/L    % Neutrophils 63.5 %    % Lymphocytes 26.3 %    % Monocytes 8.2 %    % Eosinophils 1.7 %    % Basophils 0.3 %    Absolute Neutrophil 2.3 1.6 - 8.3 10e9/L    Absolute Lymphocytes 0.9 0.8 - 5.3 10e9/L    Absolute Monocytes 0.3 0.0 - 1.3 10e9/L    Absolute Eosinophils 0.1 0.0 - 0.7 10e9/L    Absolute Basophils 0.0 0.0 - 0.2 10e9/L    Diff Method Automated Method    Ferritin     Status: Abnormal   Result Value Ref Range    Ferritin 234 (H) 12 - 150 ng/mL   Erythrocyte sedimentation rate auto     Status: Abnormal   Result Value Ref Range    Sed Rate 24 (H) 0 - 20 mm/h   Folate     Status: None   Result Value Ref Range    Folate 36.3 >5.4 ng/mL   Vitamin B12     Status: None   Result Value Ref Range    Vitamin B12 453 193 - 986 pg/mL   CRP inflammation     Status: None   Result Value Ref Range    CRP Inflammation 8.0 0.0 - 8.0 mg/L   Thank you for choosing [facility]. If you have any questions or concerns, please call me or my staff at [phone].    Sincerely,      Samreen Ugalde PA-C

## 2020-01-28 LAB
ALBUMIN SERPL-MCNC: 4.4 G/DL (ref 3.4–5)
ALP SERPL-CCNC: 66 U/L (ref 40–150)
ALT SERPL W P-5'-P-CCNC: 13 U/L (ref 0–50)
ANION GAP SERPL CALCULATED.3IONS-SCNC: 12 MMOL/L (ref 3–14)
AST SERPL W P-5'-P-CCNC: 19 U/L (ref 0–45)
BILIRUB SERPL-MCNC: 0.6 MG/DL (ref 0.2–1.3)
BUN SERPL-MCNC: 16 MG/DL (ref 7–30)
CALCIUM SERPL-MCNC: 9.4 MG/DL (ref 8.5–10.1)
CHLORIDE SERPL-SCNC: 104 MMOL/L (ref 94–109)
CO2 SERPL-SCNC: 22 MMOL/L (ref 20–32)
CREAT SERPL-MCNC: 0.66 MG/DL (ref 0.52–1.04)
FERRITIN SERPL-MCNC: 234 NG/ML (ref 12–150)
GFR SERPL CREATININE-BSD FRML MDRD: >90 ML/MIN/{1.73_M2}
GLUCOSE SERPL-MCNC: 98 MG/DL (ref 70–99)
POTASSIUM SERPL-SCNC: 3.3 MMOL/L (ref 3.4–5.3)
PROT SERPL-MCNC: 8.5 G/DL (ref 6.8–8.8)
SODIUM SERPL-SCNC: 138 MMOL/L (ref 133–144)
TSH SERPL DL<=0.005 MIU/L-ACNC: 1.28 MU/L (ref 0.4–4)

## 2021-06-27 ENCOUNTER — APPOINTMENT (OUTPATIENT)
Dept: GENERAL RADIOLOGY | Facility: CLINIC | Age: 46
End: 2021-06-27
Attending: EMERGENCY MEDICINE
Payer: COMMERCIAL

## 2021-06-27 ENCOUNTER — HOSPITAL ENCOUNTER (EMERGENCY)
Facility: CLINIC | Age: 46
Discharge: HOME | End: 2021-06-27
Attending: EMERGENCY MEDICINE | Admitting: EMERGENCY MEDICINE
Payer: COMMERCIAL

## 2021-06-27 VITALS
SYSTOLIC BLOOD PRESSURE: 118 MMHG | RESPIRATION RATE: 18 BRPM | DIASTOLIC BLOOD PRESSURE: 72 MMHG | HEART RATE: 82 BPM | TEMPERATURE: 98.3 F | OXYGEN SATURATION: 100 %

## 2021-06-27 DIAGNOSIS — M54.6 ACUTE RIGHT-SIDED THORACIC BACK PAIN: ICD-10-CM

## 2021-06-27 DIAGNOSIS — R07.81 PLEURITIC CHEST PAIN: ICD-10-CM

## 2021-06-27 LAB
ANION GAP SERPL CALCULATED.3IONS-SCNC: 4 MMOL/L (ref 3–14)
BUN SERPL-MCNC: 20 MG/DL (ref 7–30)
CALCIUM SERPL-MCNC: 8.9 MG/DL (ref 8.5–10.1)
CHLORIDE SERPL-SCNC: 103 MMOL/L (ref 94–109)
CO2 SERPL-SCNC: 26 MMOL/L (ref 20–32)
CREAT SERPL-MCNC: 0.6 MG/DL (ref 0.52–1.04)
GFR SERPL CREATININE-BSD FRML MDRD: >90 ML/MIN/{1.73_M2}
GLUCOSE SERPL-MCNC: 73 MG/DL (ref 70–99)
POTASSIUM SERPL-SCNC: 5 MMOL/L (ref 3.4–5.3)
SODIUM SERPL-SCNC: 133 MMOL/L (ref 133–144)
TROPONIN I SERPL-MCNC: <0.015 UG/L (ref 0–0.04)
TROPONIN I SERPL-MCNC: <0.015 UG/L (ref 0–0.04)

## 2021-06-27 PROCEDURE — 84484 ASSAY OF TROPONIN QUANT: CPT | Performed by: EMERGENCY MEDICINE

## 2021-06-27 PROCEDURE — 84484 ASSAY OF TROPONIN QUANT: CPT | Mod: 91 | Performed by: EMERGENCY MEDICINE

## 2021-06-27 PROCEDURE — 99284 EMERGENCY DEPT VISIT MOD MDM: CPT | Mod: 25 | Performed by: EMERGENCY MEDICINE

## 2021-06-27 PROCEDURE — 80048 BASIC METABOLIC PNL TOTAL CA: CPT | Performed by: EMERGENCY MEDICINE

## 2021-06-27 PROCEDURE — 93010 ELECTROCARDIOGRAM REPORT: CPT | Performed by: EMERGENCY MEDICINE

## 2021-06-27 PROCEDURE — 99285 EMERGENCY DEPT VISIT HI MDM: CPT | Mod: 25 | Performed by: EMERGENCY MEDICINE

## 2021-06-27 PROCEDURE — 71046 X-RAY EXAM CHEST 2 VIEWS: CPT

## 2021-06-27 PROCEDURE — 93005 ELECTROCARDIOGRAM TRACING: CPT | Performed by: EMERGENCY MEDICINE

## 2021-06-27 PROCEDURE — 36415 COLL VENOUS BLD VENIPUNCTURE: CPT | Performed by: EMERGENCY MEDICINE

## 2021-06-27 PROCEDURE — 250N000013 HC RX MED GY IP 250 OP 250 PS 637: Performed by: EMERGENCY MEDICINE

## 2021-06-27 RX ORDER — IBUPROFEN 800 MG/1
800 TABLET, FILM COATED ORAL ONCE
Status: COMPLETED | OUTPATIENT
Start: 2021-06-27 | End: 2021-06-27

## 2021-06-27 RX ADMIN — IBUPROFEN 800 MG: 800 TABLET, FILM COATED ORAL at 20:05

## 2021-06-27 NOTE — ED NOTES
Pt up to BR but forgot to collect urine spec. Pt states no chance of pregnancy because she has not had sex in over a year and is currently having her period. Informed pt of potential risk to fetus from radiation if she was pregnant; pt acknowledged understanding of this and wants to proceed with CXR without hcg test.

## 2021-06-27 NOTE — ED PROVIDER NOTES
[facility] EMERGENCY DEPARTMENT ([facility])       6/27/21  History     Chief Complaint   Patient presents with     Shoulder Pain     The history is provided by the patient and medical records.   Shoulder Pain    Nila Agarwal is a 46 year old female with no significant past medical history who presents to the Emergency Department for evaluation of sharp right shoulder pain and neck pain. Patient was driving 30 minutes ago during onset of this pain which she rates as 8/10. Her pain is located on the interior border of her r scapula . She notes that her pain worsens upon inspiration and improves when she is laying down. .  She also endorses shortness of breath and pain with inspiration in her sternal chest area. Patient denies abdominal pain, nausea, vomiting, diarrhea, constipation, headache, dizziness, or numbness/tingling in her extremities.     No history of blood clots, no history of similar symptoms.  Does not smoke or use exogenous estrogens.  No recent trauma or falls.  Feeling completely well this morning.    I have reviewed the Medications, Allergies, Past Medical and Surgical History, and Social History in the Epic system.  PAST MEDICAL HISTORY:   Past Medical History:   Diagnosis Date     Chronic rhinitis 1995     External hemorrhoids with other complication 2011     Fatigue 2012       PAST SURGICAL HISTORY:   Past Surgical History:   Procedure Laterality Date     no previous surgeries         Past medical history, past surgical history, medications, and allergies were reviewed with the patient. Additional pertinent items: None    FAMILY HISTORY:   Family History   Problem Relation Age of Onset     Family History Negative No family hx of        SOCIAL HISTORY:   Social History     Tobacco Use     Smoking status: Never Smoker     Smokeless tobacco: Never Used   Substance Use Topics     Alcohol use: No     Social history was reviewed with the patient. Additional pertinent items:  None      Patient's Medications   New Prescriptions    No medications on file   Previous Medications    VITAMIN D3 (CHOLECALCIFEROL) 2000 UNITS (50 MCG) TABLET    Take 1 tablet (2,000 Units) by mouth daily   Modified Medications    No medications on file   Discontinued Medications    No medications on file          Allergies   Allergen Reactions     Seasonal Allergies         Review of Systems  10 point review of symptoms was performed and is negative except as noted above.       Physical Exam   BP: 118/72  Pulse: 82  Temp: 98.3  F (36.8  C)  Resp: 18  SpO2: 100 %      Physical Exam  Vitals signs and nursing note reviewed.       GEN: Well appearing, non toxic, cooperative and conversant.   HEENT: The head is normocephalic and atraumatic. Pupils are equal round and reactive to light. Extraocular motions are intact. There is no facial swelling. The neck is nontender and supple.   CV: Regular rate and rhythm without murmurs rubs or gallops. 2+ radial pulses bilaterally.  PULM: Clear to auscultation bilaterally.  ABD: Soft, nontender, nondistended.   EXT: Full range of motion.  No edema.  NEURO: Cranial nerves II through XII are intact and symmetric. Bilateral upper and lower extremities grossly show full range of motion without any focal deficits.   SKIN: No rashes, ecchymosis, or lacerations  PSYCH: Calm and cooperative, interactive.     ED Course        Procedures            EKG at 1804.    pleuracy and sob at time of ECG.  ECG interpretted by me within 10 minutes of production  NSR at 83 bpm. Normal axis.  Normal intervals. Nl R-wave progress. No ST-T elevations or depressions. Pathologic T-wave inversion are absent     Interpretation: Normal ECG           Results for orders placed or performed during the hospital encounter of 06/27/21 (from the past 24 hour(s))   Basic metabolic panel   Result Value Ref Range    Sodium 133 133 - 144 mmol/L    Potassium 5.0 3.4 - 5.3 mmol/L    Chloride 103 94 - 109 mmol/L    Carbon  Dioxide 26 20 - 32 mmol/L    Anion Gap 4 3 - 14 mmol/L    Glucose 73 70 - 99 mg/dL    Urea Nitrogen 20 7 - 30 mg/dL    Creatinine 0.60 0.52 - 1.04 mg/dL    GFR Estimate >90 >60 mL/min/[1.73_m2]    GFR Estimate If Black >90 >60 mL/min/[1.73_m2]    Calcium 8.9 8.5 - 10.1 mg/dL   Troponin I   Result Value Ref Range    Troponin I ES <0.015 0.000 - 0.045 ug/L   XR Chest 2 Views    Narrative    XR CHEST TWO VIEWS   6/27/2021 6:44 PM     HISTORY: Pleuritic chest pain on right    COMPARISON: Chest x-ray 6/19/2012.      Impression    IMPRESSION: PA and lateral views of the chest. Lungs are clear. Heart  is normal in size. No effusions are evident. No pneumothorax.    JASON CLARKE MD   Troponin I   Result Value Ref Range    Troponin I ES <0.015 0.000 - 0.045 ug/L     Medications   ibuprofen (ADVIL/MOTRIN) tablet 800 mg (800 mg Oral Given 6/27/21 2005)             Assessments & Plan (with Medical Decision Making)   46F presenting with pleuritic back pain as described  Ddx includes-  Acute coronary syndrome, pulmonary embolism, pneumonia, pneumothorax, , uremia, renal failure, among other causes        cxr clear  PERC neg  ECG evidence of acute ischemia   Labs unrevealing and serial troponins negative.    Pain improved with ibuprofen.  Given the patient's clinical course and work-up, my suspicion for an emergent cardiopulmonary process is low.  -Ibuprofen as needed  -MSK back care tips and stretches  -Strict ED return precautions    - Patient agrees to our plan and is ready and eager for discharge. Care plan, follow up plan, and reasons to return immediately to the ED were dicussed in detail and summarized as noted in the discharge instructions.             I have reviewed the nursing notes.    I have reviewed the findings, diagnosis, plan and need for follow up with the patient.    New Prescriptions    No medications on file       Final diagnoses:   Pleuritic chest pain   Acute right-sided thoracic back pain       Maeve WELLER  Heidi, mariaelena serving as a trained medical scribe to document services personally performed by Renato Franco MD based on the provider's statements to me on June 27, 2021.  This document has been checked and approved by the attending provider.    I, Renato Franco MD, was physically present and have reviewed and verified the accuracy of this note documented by Maeve Gordon, medical scribe.        6/27/2021   [facility] EMERGENCY DEPARTMENT     Renato Franco MD  06/27/21 9719

## 2021-06-28 LAB — INTERPRETATION ECG - MUSE: NORMAL

## 2021-06-28 NOTE — ED NOTES
Patient Verbalized understanding of discharge instructions including medication administration and recommended follow up care as noted on discharge instructions. Written discharge instructions given, denies any further questions.

## 2021-06-28 NOTE — DISCHARGE INSTRUCTIONS
You can take ibuprofen for pain. The maximum daily dose is 2400 mg and the maximum single dose as a time is 800mg. For your condition, your should take 600mg every 4 hours as needed for pain.    Return to the emergency department immediately for any chest pain, back pain, shortness of breath, weakness, abdominal pain nausea, vomiting, or any other concerns as given or discussed

## 2021-07-24 ENCOUNTER — APPOINTMENT (OUTPATIENT)
Dept: ULTRASOUND IMAGING | Facility: CLINIC | Age: 46
End: 2021-07-24
Attending: EMERGENCY MEDICINE
Payer: COMMERCIAL

## 2021-07-24 ENCOUNTER — HOSPITAL ENCOUNTER (EMERGENCY)
Facility: CLINIC | Age: 46
Discharge: HOME | End: 2021-07-24
Attending: EMERGENCY MEDICINE | Admitting: EMERGENCY MEDICINE
Payer: COMMERCIAL

## 2021-07-24 VITALS
SYSTOLIC BLOOD PRESSURE: 105 MMHG | HEART RATE: 93 BPM | OXYGEN SATURATION: 99 % | DIASTOLIC BLOOD PRESSURE: 50 MMHG | TEMPERATURE: 97.7 F | RESPIRATION RATE: 16 BRPM

## 2021-07-24 DIAGNOSIS — M79.89 LEG SWELLING: ICD-10-CM

## 2021-07-24 PROCEDURE — 99283 EMERGENCY DEPT VISIT LOW MDM: CPT | Performed by: EMERGENCY MEDICINE

## 2021-07-24 PROCEDURE — 93970 EXTREMITY STUDY: CPT

## 2021-07-24 PROCEDURE — 99284 EMERGENCY DEPT VISIT MOD MDM: CPT | Mod: 25

## 2021-07-25 NOTE — ED PROVIDER NOTES
ED Provider Note  [facility]      History     Chief Complaint   Patient presents with     Leg Swelling     Bilateral leg swelling, had vaccine on the 17th     The history is provided by the patient and a relative.     Nila Agarwal is a 46 year old female with no significant medical history who  presents to the ED for evaluation bilateral leg edema. Patient reports the edema began 1 week ago after her 2nd Moderna COVID-19 Vaccine on 7/17/21. Patient reports no other medical problems.     Per chart review patient was seen in the ED on 6/27/21 presenting with pleuritic back and neck pain.  Labs were unrevealing and serial troponins negative. Patient was discharged when cardiac workup was non emergent.         Past Medical History  Past Medical History:   Diagnosis Date     Chronic rhinitis 1995     External hemorrhoids with other complication 2011     Fatigue 2012     Past Surgical History:   Procedure Laterality Date     no previous surgeries       vitamin D3 (CHOLECALCIFEROL) 2000 units (50 mcg) tablet      Allergies   Allergen Reactions     Seasonal Allergies      Family History  Family History   Problem Relation Age of Onset     Family History Negative No family hx of      Social History   Social History     Tobacco Use     Smoking status: Never Smoker     Smokeless tobacco: Never Used   Substance Use Topics     Alcohol use: No     Drug use: No      Past medical history, past surgical history, medications, allergies, family history, and social history were reviewed with the patient. No additional pertinent items.       Review of Systems   Cardiovascular: Positive for leg swelling (Bilateral).   All other systems reviewed and are negative.    A complete review of systems was performed with pertinent positives and negatives noted in the HPI, and all other systems negative.    Physical Exam   BP: 105/50  Pulse: 93  Temp: 97.7  F (36.5  C)  Resp: 16  SpO2: 99 %  Physical Exam  Vitals  and nursing note reviewed.   Constitutional:       General: She is not in acute distress.     Appearance: Normal appearance. She is not diaphoretic.   HENT:      Head: Atraumatic.      Mouth/Throat:      Pharynx: No oropharyngeal exudate.   Eyes:      General: No scleral icterus.     Pupils: Pupils are equal, round, and reactive to light.   Cardiovascular:      Rate and Rhythm: Normal rate and regular rhythm.      Heart sounds: Normal heart sounds.   Pulmonary:      Effort: No respiratory distress.      Breath sounds: Normal breath sounds.   Abdominal:      General: Bowel sounds are normal.      Palpations: Abdomen is soft.      Tenderness: There is no abdominal tenderness.   Musculoskeletal:         General: No tenderness.      Right lower leg: No edema.      Left lower leg: No edema.   Skin:     General: Skin is warm.      Findings: No rash.   Neurological:      General: No focal deficit present.      Mental Status: She is alert and oriented to person, place, and time.         ED Course      Procedures          Results for orders placed or performed during the hospital encounter of 07/24/21   US Lower Extremity Venous Duplex Bilateral     Status: None    Narrative    US LOWER EXTREMITY VENOUS DUPLEX BILATERAL 7/24/2021 8:21 PM    CLINICAL HISTORY: Leg swelling.    TECHNIQUE: Venous Duplex ultrasound of bilateral lower extremities  with and without compression, augmentation and duplex. Color flow and  spectral Doppler with waveform analysis performed.    COMPARISON: None.    FINDINGS: Exam includes the common femoral, femoral, popliteal veins  as well as segmentally visualized deep calf veins and greater  saphenous vein.     RIGHT: No deep vein thrombosis. No superficial thrombophlebitis. No  popliteal cyst.    LEFT: No deep vein thrombosis. No superficial thrombophlebitis. No  popliteal cyst.      Impression    IMPRESSION:  No deep venous thrombosis in the bilateral lower extremities.    TIMBO GOLDEN MD          SYSTEM ID:  [id number]     Medications - No data to display     Assessments & Plan (with Medical Decision Making)     46 year old female with no significant medical history who  presents to the ED for evaluation bilateral leg edema.  Ultrasound bilateral lower extremities obtained which revealed no evidence of DVT.  Plan for discharge home follow-up with primary care provider within 5 to 7 days with consideration for repeat ultrasound at that time..    I have reviewed the nursing notes. I have reviewed the findings, diagnosis, plan and need for follow up with the patient.    New Prescriptions    No medications on file       Final diagnoses:   Leg swelling       --  I, Deborah Jones, am serving as a trained medical scribe to document services personally performed by , MD, based on the provider's statements to me.     I, , MD, was physically present and have reviewed and verified the accuracy of this note documented by Deborah Jones.    Jessica Ravi MD  [facility] EMERGENCY DEPARTMENT  7/24/2021     Jessica Ravi MD  08/16/21 [id number]

## 2021-07-26 ENCOUNTER — TELEPHONE (OUTPATIENT)
Dept: FAMILY MEDICINE | Facility: CLINIC | Age: 46
End: 2021-07-26

## 2023-04-11 ENCOUNTER — ANCILLARY PROCEDURE (OUTPATIENT)
Dept: ULTRASOUND IMAGING | Facility: CLINIC | Age: 48
End: 2023-04-11
Attending: NURSE PRACTITIONER
Payer: COMMERCIAL

## 2023-04-11 DIAGNOSIS — E07.9 ASYMMETRICAL THYROID: ICD-10-CM

## 2023-04-11 DIAGNOSIS — E04.1 THYROID NODULE: ICD-10-CM

## 2023-04-11 PROCEDURE — 76536 US EXAM OF HEAD AND NECK: CPT | Mod: GC | Performed by: STUDENT IN AN ORGANIZED HEALTH CARE EDUCATION/TRAINING PROGRAM

## 2023-06-14 ENCOUNTER — TRANSFERRED RECORDS (OUTPATIENT)
Dept: MULTI SPECIALTY CLINIC | Facility: CLINIC | Age: 48
End: 2023-06-14

## 2023-11-21 ENCOUNTER — TRANSCRIBE ORDERS (OUTPATIENT)
Dept: OTHER | Age: 48
End: 2023-11-21

## 2023-11-21 ENCOUNTER — MEDICAL CORRESPONDENCE (OUTPATIENT)
Dept: HEALTH INFORMATION MANAGEMENT | Facility: CLINIC | Age: 48
End: 2023-11-21
Payer: COMMERCIAL

## 2023-11-21 DIAGNOSIS — E04.1 THYROID NODULE: Primary | ICD-10-CM

## 2024-01-01 ENCOUNTER — HOSPITAL ENCOUNTER (EMERGENCY)
Facility: CLINIC | Age: 49
Discharge: HOME | End: 2024-01-01
Attending: STUDENT IN AN ORGANIZED HEALTH CARE EDUCATION/TRAINING PROGRAM | Admitting: STUDENT IN AN ORGANIZED HEALTH CARE EDUCATION/TRAINING PROGRAM
Payer: COMMERCIAL

## 2024-01-01 VITALS
HEART RATE: 80 BPM | OXYGEN SATURATION: 99 % | TEMPERATURE: 97.8 F | DIASTOLIC BLOOD PRESSURE: 72 MMHG | WEIGHT: 133.5 LBS | HEIGHT: 66 IN | BODY MASS INDEX: 21.45 KG/M2 | RESPIRATION RATE: 16 BRPM | SYSTOLIC BLOOD PRESSURE: 122 MMHG

## 2024-01-01 DIAGNOSIS — K64.5 THROMBOSED EXTERNAL HEMORRHOIDS: ICD-10-CM

## 2024-01-01 PROCEDURE — 99283 EMERGENCY DEPT VISIT LOW MDM: CPT | Performed by: STUDENT IN AN ORGANIZED HEALTH CARE EDUCATION/TRAINING PROGRAM

## 2024-01-01 PROCEDURE — 99284 EMERGENCY DEPT VISIT MOD MDM: CPT | Mod: FS | Performed by: STUDENT IN AN ORGANIZED HEALTH CARE EDUCATION/TRAINING PROGRAM

## 2024-01-01 RX ORDER — HYDROCORTISONE 2.5 %
CREAM (GRAM) TOPICAL 2 TIMES DAILY
Qty: 30 G | Refills: 0 | Status: SHIPPED | OUTPATIENT
Start: 2024-01-01 | End: 2024-01-01

## 2024-01-01 RX ORDER — SENNOSIDES 8.6 MG
1 TABLET ORAL 2 TIMES DAILY PRN
Qty: 20 TABLET | Refills: 0 | Status: SHIPPED | OUTPATIENT
Start: 2024-01-01 | End: 2024-01-01

## 2024-01-01 RX ORDER — HYDROCORTISONE 2.5 %
CREAM (GRAM) TOPICAL 2 TIMES DAILY
Qty: 30 G | Refills: 0 | Status: SHIPPED | OUTPATIENT
Start: 2024-01-01

## 2024-01-01 RX ORDER — SENNOSIDES 8.6 MG
1 TABLET ORAL 2 TIMES DAILY PRN
Qty: 20 TABLET | Refills: 0 | Status: SHIPPED | OUTPATIENT
Start: 2024-01-01

## 2024-01-01 ASSESSMENT — ACTIVITIES OF DAILY LIVING (ADL): ADLS_ACUITY_SCORE: 33

## 2024-01-01 NOTE — DISCHARGE INSTRUCTIONS
TODAY'S VISIT:  You were seen today for hemorrhoids     - If you had any labs or imaging/radiology tests performed today, you should also discuss these tests with your usual provider.     FOLLOW-UP:  Please make an appointment to follow up with:  - colorectal surgery to discuss removal of hemorrhoids. [facility] will call you to coordinate care as prescribed your provider. If you don t hear from a representative within 2 business days, please call [phone].   - you primary care provider as needed     PRESCRIPTIONS / MEDICATIONS:  - TUCKS pads, use instead of toilet paper to wipe your bottom  - Hydrocortisone cream, apply to hemorrhoids twice daily  - sennosides - 1 tablet 1-2 times daily as needed to soften stool and reduce straining when having a bowel movement    OTHER INSTRUCTIONS:  - increase amount of fiber in your diet (see attached recommendations) or take a fiber supplement such as Metamucil - you can purchase this at any pharmacy without a prescription  - drink a lot of water during the day to prevent constipation   - avoid high fat foods as these can increase constipation    RETURN TO THE EMERGENCY DEPARTMENT  Return to the Emergency Department at any time for any new or worsening symptoms or any concerns.

## 2024-01-02 NOTE — ED PROVIDER NOTES
"ED Provider Note  [facility]      History     Chief Complaint   Patient presents with    Rectal/perineal Pain     Reports pain in rectal area, difficulty defecating, reports constipation; denies any bloody stools or bleeding from rectum     HPI  Nila Agarwal is a 49 year old female with past medical history significant for hemorrhoids who presents for evaluation of rectal pain.  Patient reports 2 days ago she felt constipated, had to strain to have a bowel movement and after the bowel movement developed rectal pain.  Noted a small amount of blood on the tissue paper when she wiped but no blood or melena stool.  She reports she applied medications that she has previously used for hemorrhoids and pain has improved but is still present.  She came in today because she is concerned for infection.  She denies any abdominal pain or distention, fever or chills or other symptoms.    Past Medical History  Past Medical History:   Diagnosis Date    Chronic rhinitis 1995    External hemorrhoids with other complication 2011    Fatigue 2012     Past Surgical History:   Procedure Laterality Date    no previous surgeries       hydrocortisone 2.5 % cream  sennosides (SENOKOT) 8.6 MG tablet  witch hazel-glycerin (TUCKS) pad  vitamin D3 (CHOLECALCIFEROL) 2000 units (50 mcg) tablet      Allergies   Allergen Reactions    Seasonal Allergies      Family History  Family History   Problem Relation Age of Onset    Family History Negative No family hx of      Social History   Social History     Tobacco Use    Smoking status: Never    Smokeless tobacco: Never   Substance Use Topics    Alcohol use: No    Drug use: No         A medically appropriate review of systems was performed with pertinent positives and negatives noted in the HPI, and all other systems negative.    Physical Exam   BP: 122/72  Pulse: 80  Temp: 97.8  F (36.6  C)  Resp: 16  Height: 167.6 cm (5' 6\")  Weight: 60.6 kg (133 lb 8 oz)  SpO2: 99 " %  Physical Exam  Vitals and nursing note reviewed.   Constitutional:       Appearance: Normal appearance.   HENT:      Head: Normocephalic and atraumatic.   Abdominal:      General: Abdomen is flat. There is no distension.      Palpations: Abdomen is soft.      Tenderness: There is no abdominal tenderness. There is no guarding.   Genitourinary:     Comments: Rectum: 3 thrombosed external hemorrhoids without evidence of ongoing bleeding. No scabbing, erythema, or other evidence of infection.  Skin:     General: Skin is warm and dry.      Capillary Refill: Capillary refill takes less than 2 seconds.   Neurological:      General: No focal deficit present.      Mental Status: She is alert and oriented to person, place, and time.   Psychiatric:         Mood and Affect: Mood normal.         Behavior: Behavior normal.           ED Course, Procedures, & Data      Procedures            No results found for any visits on 01/01/24.  Medications - No data to display  Labs Ordered and Resulted from Time of ED Arrival to Time of ED Departure - No data to display  No orders to display          Critical care was not performed.     Medical Decision Making  The patient's presentation was of low complexity (an acute and uncomplicated illness or injury).    The patient's evaluation involved:  review of external note(s) from 3+ sources (see separate area of note for details)    The patient's management necessitated moderate risk (prescription drug management including medications given in the ED).    Assessment & Plan    Nila Agarwal is a 49 year old female with past medical history significant for hemorrhoids who presents for evaluation of rectal pain.  Exam reveals 3 thrombosed hemorrhoids without evidence of ongoing bleeding.  Patient's symptoms have improved with at home treatments since yesterday.  She is not interested in excision or any procedural intervention today.  She is without fever or symptoms concerning for  infection.  Will discharge patient home with recommendation for sitz bath's, Tucks pads instead of toilet paper, hydrocortisone cream to hemorrhoids as well as dietary recommendations to improve constipation and prescription for Senokot.  Furl placed for colorectal surgery should she wish to pursue excision or other interventions.    I have reviewed the nursing notes. I have reviewed the findings, diagnosis, plan and need for follow up with the patient.  Patient felt comfortable with this plan and was discharged from the emergency department.    Discharge Medication List as of 1/1/2024  4:07 PM        START taking these medications    Details   witch hazel-glycerin (TUCKS) pad Apply topically as needed for hemorrhoidsDisp-100 each, R-0Local Print      hydrocortisone 2.5 % cream Apply topically 2 times dailyDisp-30 g, R-0Local Print      sennosides (SENOKOT) 8.6 MG tablet Take 1 tablet by mouth 2 times daily as needed for constipation, Disp-20 tablet, R-0, Local Print             Final diagnoses:   Thrombosed external hemorrhoids       TATI Lindquist CNP   [facility] EMERGENCY DEPARTMENT  1/1/2024     Payton Bowman APRN CNP  01/01/24 8940  --    ED Attending Physician Attestation    I Allie Gill MD, cared for this patient with the Advanced Practice Provider (LA). I have performed a history and physical examination of the patient independent of the LA. I reviewed the LA's documentation above and agree with the documented findings and plan of care. I personally provided a substantive portion of the care for this patient, including the complete Medical Decision Making. Please see the LA's documentation for full details.    Medical Decision Making  The patient's presentation was of low complexity (an acute and uncomplicated illness or injury).    The patient's evaluation involved:  review of external note(s) from 3+ sources (see separate area of note for details)  review of 3+ test result(s)  ordered prior to this encounter (see separate area of note for details)    The patient's management necessitated moderate risk (prescription drug management including medications given in the ED).          Allie Gill MD  Emergency Medicine        Allie Gill MD  01/06/24 [id number]

## 2024-01-03 NOTE — TELEPHONE ENCOUNTER
Diagnosis, Referred by & from: Hemorrhoids   Appt date: 3/19/2024   NOTES STATUS DETAILS   OFFICE NOTE from referring provider N/A    OFFICE NOTE from other specialist N/A    DISCHARGE SUMMARY from hospital N/A    DISCHARGE REPORT from the ER Care Everywhere / Internal [facility]:  1/1/24 - ED OV with Dr. Jairo Britt:  6/2/23 - ED OV with HERBIE Denny  2/20/22 - ED OV with Dr. Louis   OPERATIVE REPORT N/A    MEDICATION LIST Internal    LABS N/A    DIAGNOSTIC PROCEDURES N/A    IMAGING (DISC & REPORT) N/A

## 2024-01-10 NOTE — TELEPHONE ENCOUNTER
RECORDS RECEIVED FROM: Thyroid Nodule   DATE RECEIVED: 1/31/2024   NOTES (FOR ALL VISITS) STATUS DETAILS   OFFICE NOTES from referring provider Care Every[facility]:  4/10/23 - [facility] OV with Deborah Walker NP   OFFICE NOTES from other specialist N/A    ED NOTES N/A    OPERATIVE REPORT  (thyroid, pituitary, adrenal, parathyroid) N/A    MEDICATION LIST Care Everywhere    IMAGING      ULTRASOUND (HEAD/NECK) Internal [facility]:  4/11/23 - US Thyroid   LABS     DIABETES: HBGA1C, CREATININE, FASTING LIPIDS, MICROALBUMIN URINE, POTASSIUM, TSH, T4    THYROID: TSH, T4, CBC, THYRODLONULIN, TOTAL T3, FREE T4, CALCITONIN, CEA Care Everywhere   [facility]:  6/2/23 - BMP  6/2/23 - CBC  6/2/23 - Troponin    [facility]  4/10/23 - TSH, T4  3/22/22 - Lipid  3/4/22 - CMP

## 2024-01-31 ENCOUNTER — OFFICE VISIT (OUTPATIENT)
Dept: ENDOCRINOLOGY | Facility: CLINIC | Age: 49
End: 2024-01-31
Payer: COMMERCIAL

## 2024-01-31 ENCOUNTER — PRE VISIT (OUTPATIENT)
Dept: ENDOCRINOLOGY | Facility: CLINIC | Age: 49
End: 2024-01-31

## 2024-01-31 ENCOUNTER — LAB (OUTPATIENT)
Dept: LAB | Facility: CLINIC | Age: 49
End: 2024-01-31
Payer: COMMERCIAL

## 2024-01-31 VITALS
HEIGHT: 64 IN | HEART RATE: 92 BPM | BODY MASS INDEX: 21.65 KG/M2 | DIASTOLIC BLOOD PRESSURE: 73 MMHG | OXYGEN SATURATION: 100 % | WEIGHT: 126.8 LBS | SYSTOLIC BLOOD PRESSURE: 111 MMHG

## 2024-01-31 DIAGNOSIS — E04.1 THYROID NODULE: ICD-10-CM

## 2024-01-31 LAB — TSH SERPL DL<=0.005 MIU/L-ACNC: 0.81 UIU/ML (ref 0.3–4.2)

## 2024-01-31 PROCEDURE — 36415 COLL VENOUS BLD VENIPUNCTURE: CPT | Performed by: PATHOLOGY

## 2024-01-31 PROCEDURE — 84443 ASSAY THYROID STIM HORMONE: CPT | Performed by: PATHOLOGY

## 2024-01-31 PROCEDURE — 99204 OFFICE O/P NEW MOD 45 MIN: CPT | Performed by: INTERNAL MEDICINE

## 2024-01-31 ASSESSMENT — PAIN SCALES - GENERAL: PAINLEVEL: NO PAIN (0)

## 2024-01-31 NOTE — PROGRESS NOTES
Endocrinology Clinic Visit    Chief Complaint: Endocrine Problem (Thyroid)     Information obtained from:Patient    Professional [employer] interpretor used for this visit   Assessment/Treatment Plan:      Thyroid nodule:     I have personally reviewed thyroid ultrasound from 4/11/2023 and agree with the interpretation of 5 nodule measuring 1.5 x 1.1 x 1.2 cm nodule which is completely solid and isoechoic.  This nodule is taller than wide and has punctate echogenic foci.  I have compared this thyroid ultrasound from 4/16/2019 and this nodule compared to previous study is unchanged in size however punctate echogenic foci which was noted on this current study is new compared to previous.  This nodule has been biopsied in 2019 on 6/13/2019 and result was benign.  Based on change in the characteristics; I recommended thyroid biopsy.  If thyroid biopsy result is negative then patient can repeat a routine thyroid ultrasound in 2 years.      Of note, initially discussed with the patient that we can do a follow-up thyroid ultrasound however upon further review of the images; decision was made to pursue biopsy at this time particularly considering the fact that the punctate echogenic foci findings are new on the most recent thyroid ultrasound.  This writer called patient twice using [employer]  however no one answered.  On the second call I left a message to patient with detailed information regarding pursuing biopsy.  Informed clinic team to call patient and schedule the FNA.    Test and/or medications prescribed today:  Orders Placed This Encounter   Procedures    US Biopsy Thyroid Fine Needle Aspiration    US Thyroid    TSH with free T4 reflex    Fine Needle Aspirate         Donavon Pearl MD  Staff Endocrinologist    Division of Endocrinology and Diabetes      Subjective:         HPI: Nila Agarwal is a 49 year old female with history of thyroid nodule who is here in consultation for the same.   "    Incidentally noted to have thyroid nodule initially 2014 based on goiter and at the time thyroid ultrasound showed 6 mm heterogeneous solid nodule in the mid left thyroid lobe.  Follow-up thyroid ultrasound in 2019 showed the nodule had increased in size to 1.6 cm and at that time patient underwent biopsy and biopsy result was benign.  Patient does not have family history of thyroid cancer or radiation treatment to the neck.  No problem with swallowing or breathing.  TSH has always been normal.  Patient is here today for follow-up of thyroid ultrasound which was done on 4/11/2023 .  \"Nodule 1:     Lobe: Left  Location: Mid, medial  Size: 0.5 x 0.4 x 0.3 cm  Composition: Mixed cystic and solid (1 point)  Echogenicity: Hypoechoic (2 points)  Shape: Wider than tall (0 points)  Margin: Smooth (0 points)  Echogenic Foci: None or large comet tail artifact (0 points)  Stability: No significant change in size, previously 0.4 x 0.3 x 0.3  cm  TIRADS: TR3 (3 points)      Nodule 2:     Lobe: Left  Location: Mid  Size: 1.5 x 1.1 x 1.2 cm  Composition: Solid or almost completely solid (2 points)  Echogenicity: Hyperechoic or isoechoic (1 point)  Shape: Taller than wide (3 points)  Margin: Smooth (0 points)  Echogenic Foci: Punctate echogenic foci (3 points)  Stability: No significant change in size, previously 1.6 x 1.2 x 1 cm  TIRADS: TR5 (>/= 7 points) \"  Allergies   Allergen Reactions    Seasonal Allergies        Current Outpatient Medications   Medication Sig Dispense Refill    hydrocortisone 2.5 % cream Apply topically 2 times daily 30 g 0    sennosides (SENOKOT) 8.6 MG tablet Take 1 tablet by mouth 2 times daily as needed for constipation 20 tablet 0    witch hazel-glycerin (TUCKS) pad Apply topically as needed for hemorrhoids 100 each 0    vitamin D3 (CHOLECALCIFEROL) 2000 units (50 mcg) tablet Take 1 tablet (2,000 Units) by mouth daily (Patient not taking: Reported on 1/31/2024) 90 tablet 11       Review of Systems " "    11 point review system (Constitutional, HENT, Eyes, Respiratory, Cardiovascular, Gastrointestinal, Genitourinary, Musculoskeletal,Neurological, Psychiatric/Behavioural, Endocrine) is negative or is as per HPI above.  Past medical history, past surgical history, social and family history pertinent to the visit reviewed.    Objective:   /73 (BP Location: Right arm, Patient Position: Sitting, Cuff Size: Adult Regular)   Pulse 92   Ht 1.625 m (5' 3.98\")   Wt 57.5 kg (126 lb 12.8 oz)   SpO2 100%   BMI 21.78 kg/m    Constitutional: Pleasant no acute cardiopulmonary distress.   EYES: anicteric, normal extra-ocular movements, no lid lag or retraction, is equal and reactive to light bilaterally.  HEENT: Mouth/Throat: Mucous membrane is moist. Oropharynx is clear.  Thyroid examination: Did not appreciate left thyroid nodule on exam   cardiovascular: RRR, S1, S2 normal.   Pulmonary/Chest: CTAB. No wheezing or rales.   Abdominal: +BS. Non tender to palpation.  Stretch marks: None  Neurological: Alert and oriented.  No tremor and reflexes are symmetrical bilaterally and within the normal limits. Muscle strength 5/5.   Extremities: No edema.  Psychological: appropriate mood and affect    In House Labs:       TSH   Date Value Ref Range Status   01/31/2024 0.81 0.30 - 4.20 uIU/mL Final   01/27/2020 1.28 0.40 - 4.00 mU/L Final   04/10/2019 1.22 0.40 - 4.00 mU/L Final   10/09/2014 1.56 0.40 - 4.00 mU/L Final     Comment:     Effective 7/30/2014, the reference range for this assay has changed to reflect   new instrumentation/methodology.     07/25/2013 0.88 0.4 - 5.0 mU/L Final       Creatinine   Date Value Ref Range Status   06/27/2021 0.60 0.52 - 1.04 mg/dL Final   ]    Patient Name: BARAK BURLESON  MR#: [id number]  Specimen #: [id number]  Collected: 6/13/2019  Received: 6/13/2019  Reported: 6/14/2019 13:35  Ordering Phy(s): JESSICA CUTLER    For improved result formatting, select 'View Enhanced Report Format' " under   Linked Documents section.    SPECIMEN/STAIN PROCESS:  Thyroid, left nodule, ultrasound guided fine needle aspiration       Pap-Cyto x 3, Diff Quick Stain-cyto x 3    ----------------------------------------------------------------    CYTOLOGIC INTERPRETATION:     Thyroid, left nodule, ultrasound guided fine needle aspiration:   Benign  Consistent with a benign nodule (includes adenomatoid nodule, colloid  nodule, etc.)     This note has been dictated using voice recognition software.  As a result, there may be errors in the documentation that have gone undetected.  Please consider this when interpreting information in this documentation.   42 minutes spent by me on the date of the encounter doing chart review, reviewing ultrasound images over the years, coordination of care, history and exam, documentation and further activities per the note.

## 2024-01-31 NOTE — NURSING NOTE
"Chief Complaint   Patient presents with    Endocrine Problem     Thyroid     Blood pressure 111/73, pulse 92, height 1.625 m (5' 3.98\"), weight 57.5 kg (126 lb 12.8 oz), SpO2 100%, not currently breastfeeding.  Donna Kulkarni    "

## 2024-01-31 NOTE — LETTER
1/31/2024       RE: Nila Agarwal  [address]  [address]  [place] [address]     Dear Colleague,    Thank you for referring your patient, Nila Agarwal, to the [facility] ENDOCRINOLOG[facility] [place] [facility]. Please see a copy of my visit note below.    Endocrinology Clinic Visit    Chief Complaint: Endocrine Problem (Thyroid)     Information obtained from:Patient    Professional Mauritian interpretor used for this visit   Assessment/Treatment Plan:      Thyroid nodule:     I have personally reviewed thyroid ultrasound from 4/11/2023 and agree with the interpretation of 5 nodule measuring 1.5 x 1.1 x 1.2 cm nodule which is completely solid and isoechoic.  This nodule is taller than wide and has punctate echogenic foci.  I have compared this thyroid ultrasound from 4/16/2019 and this nodule compared to previous study is unchanged in size however punctate echogenic foci which was noted on this current study is new compared to previous.  This nodule has been biopsied in 2019 on 6/13/2019 and result was benign.  Based on change in the characteristics; I recommended thyroid biopsy.  If thyroid biopsy result is negative then patient can repeat a routine thyroid ultrasound in 2 years.      Of note, initially discussed with the patient that we can do a follow-up thyroid ultrasound however upon further review of the images; decision was made to pursue biopsy at this time particularly considering the fact that the punctate echogenic foci findings are new on the most recent thyroid ultrasound.  This writer called patient twice using [employer]  however no one answered.  On the second call I left a message to patient with detailed information regarding pursuing biopsy.  Informed clinic team to call patient and schedule the FNA.    Test and/or medications prescribed today:  Orders Placed This Encounter   Procedures    US Biopsy Thyroid Fine  "Needle Aspiration    US Thyroid    TSH with free T4 reflex    Fine Needle Aspirate         Donavon Pearl MD  Staff Endocrinologist    Division of Endocrinology and Diabetes      Subjective:         HPI: Nila Agarwal is a 49 year old female with history of thyroid nodule who is here in consultation for the same.      Incidentally noted to have thyroid nodule initially 2014 based on goiter and at the time thyroid ultrasound showed 6 mm heterogeneous solid nodule in the mid left thyroid lobe.  Follow-up thyroid ultrasound in 2019 showed the nodule had increased in size to 1.6 cm and at that time patient underwent biopsy and biopsy result was benign.  Patient does not have family history of thyroid cancer or radiation treatment to the neck.  No problem with swallowing or breathing.  TSH has always been normal.  Patient is here today for follow-up of thyroid ultrasound which was done on 4/11/2023 .  \"Nodule 1:     Lobe: Left  Location: Mid, medial  Size: 0.5 x 0.4 x 0.3 cm  Composition: Mixed cystic and solid (1 point)  Echogenicity: Hypoechoic (2 points)  Shape: Wider than tall (0 points)  Margin: Smooth (0 points)  Echogenic Foci: None or large comet tail artifact (0 points)  Stability: No significant change in size, previously 0.4 x 0.3 x 0.3  cm  TIRADS: TR3 (3 points)      Nodule 2:     Lobe: Left  Location: Mid  Size: 1.5 x 1.1 x 1.2 cm  Composition: Solid or almost completely solid (2 points)  Echogenicity: Hyperechoic or isoechoic (1 point)  Shape: Taller than wide (3 points)  Margin: Smooth (0 points)  Echogenic Foci: Punctate echogenic foci (3 points)  Stability: No significant change in size, previously 1.6 x 1.2 x 1 cm  TIRADS: TR5 (>/= 7 points) \"  Allergies   Allergen Reactions    Seasonal Allergies        Current Outpatient Medications   Medication Sig Dispense Refill    hydrocortisone 2.5 % cream Apply topically 2 times daily 30 g 0    sennosides (SENOKOT) 8.6 MG tablet Take 1 tablet by mouth " "2 times daily as needed for constipation 20 tablet 0    witch hazel-glycerin (TUCKS) pad Apply topically as needed for hemorrhoids 100 each 0    vitamin D3 (CHOLECALCIFEROL) 2000 units (50 mcg) tablet Take 1 tablet (2,000 Units) by mouth daily (Patient not taking: Reported on 1/31/2024) 90 tablet 11       Review of Systems     11 point review system (Constitutional, HENT, Eyes, Respiratory, Cardiovascular, Gastrointestinal, Genitourinary, Musculoskeletal,Neurological, Psychiatric/Behavioural, Endocrine) is negative or is as per HPI above.  Past medical history, past surgical history, social and family history pertinent to the visit reviewed.    Objective:   /73 (BP Location: Right arm, Patient Position: Sitting, Cuff Size: Adult Regular)   Pulse 92   Ht 1.625 m (5' 3.98\")   Wt 57.5 kg (126 lb 12.8 oz)   SpO2 100%   BMI 21.78 kg/m    Constitutional: Pleasant no acute cardiopulmonary distress.   EYES: anicteric, normal extra-ocular movements, no lid lag or retraction, is equal and reactive to light bilaterally.  HEENT: Mouth/Throat: Mucous membrane is moist. Oropharynx is clear.  Thyroid examination: Did not appreciate left thyroid nodule on exam   cardiovascular: RRR, S1, S2 normal.   Pulmonary/Chest: CTAB. No wheezing or rales.   Abdominal: +BS. Non tender to palpation.  Stretch marks: None  Neurological: Alert and oriented.  No tremor and reflexes are symmetrical bilaterally and within the normal limits. Muscle strength 5/5.   Extremities: No edema.  Psychological: appropriate mood and affect    In House Labs:       TSH   Date Value Ref Range Status   01/31/2024 0.81 0.30 - 4.20 uIU/mL Final   01/27/2020 1.28 0.40 - 4.00 mU/L Final   04/10/2019 1.22 0.40 - 4.00 mU/L Final   10/09/2014 1.56 0.40 - 4.00 mU/L Final     Comment:     Effective 7/30/2014, the reference range for this assay has changed to reflect   new instrumentation/methodology.     07/25/2013 0.88 0.4 - 5.0 mU/L Final       Creatinine   Date " Value Ref Range Status   06/27/2021 0.60 0.52 - 1.04 mg/dL Final   ]    Patient Name: BARAK BURLESON  MR#: [id number]  Specimen #: [id number]  Collected: 6/13/2019  Received: 6/13/2019  Reported: 6/14/2019 13:35  Ordering Phy(s): JESSICA CUTLER    For improved result formatting, select 'View Enhanced Report Format' under   Linked Documents section.    SPECIMEN/STAIN PROCESS:  Thyroid, left nodule, ultrasound guided fine needle aspiration       Pap-Cyto x 3, Diff Quick Stain-cyto x 3    ----------------------------------------------------------------    CYTOLOGIC INTERPRETATION:     Thyroid, left nodule, ultrasound guided fine needle aspiration:   Benign  Consistent with a benign nodule (includes adenomatoid nodule, colloid  nodule, etc.)     This note has been dictated using voice recognition software.  As a result, there may be errors in the documentation that have gone undetected.  Please consider this when interpreting information in this documentation.   42 minutes spent by me on the date of the encounter doing chart review, reviewing ultrasound images over the years, coordination of care, history and exam, documentation and further activities per the note.     Donavon Pearl MD

## 2024-02-02 ENCOUNTER — TELEPHONE (OUTPATIENT)
Dept: ENDOCRINOLOGY | Facility: CLINIC | Age: 49
End: 2024-02-02
Payer: COMMERCIAL

## 2024-02-02 NOTE — RESULT ENCOUNTER NOTE
Please inform Nila Agarwal to schedule thyroid biopsy [left a message to patient previously on the phone] and also please inform that thyroid blood work result is within the normal limits.  Donavon Pearl MD

## 2024-02-02 NOTE — TELEPHONE ENCOUNTER
SHAUNNA and sent letter x1 to schedule ultrasound biopsy from Dr. Ryanne Weinberg on 2/2/2024 at 12:41 PM

## 2024-02-05 ENCOUNTER — TELEPHONE (OUTPATIENT)
Dept: ENDOCRINOLOGY | Facility: CLINIC | Age: 49
End: 2024-02-05
Payer: COMMERCIAL

## 2024-02-05 NOTE — TELEPHONE ENCOUNTER
Spoke w/ Pt Via French speaking  with review and recommendation from Dr Pearl.     Pt asking why a biopsy is needed if results are within normal limits. She does not wish to schedule if not needed.     Provider notified to please clarify.   Tressa Robbins, RN on 2/5/2024 at 12:38 PM               RE    Message  Received: 3 days ago  Donavon Pearl MD  [facility] Triage-  Please inform Nila Agarwal to schedule thyroid biopsy [left a message to patient previously on the phone] and also please inform that thyroid blood work result is within the normal limits.  Donavon Pearl MD

## 2024-02-06 ENCOUNTER — TELEPHONE (OUTPATIENT)
Dept: ENDOCRINOLOGY | Facility: CLINIC | Age: 49
End: 2024-02-06
Payer: COMMERCIAL

## 2024-02-06 NOTE — TELEPHONE ENCOUNTER
Patient call:     Appointment type: FNA THYROID BIOPSY  Provider: Ryanne   Return date: next available   Speciality phone number: [phone]   Additional appointment(s) needed: N/A   Additional notes: LVM, No MyC x1   CCs: please help schedule FNA and ensure Greek speaking  is present.  Thank you. Per Mercedes Keller on 2/6/2024 at 11:16 AM

## 2024-02-06 NOTE — TELEPHONE ENCOUNTER
Spoke w/ Pt: confirms understanding of review and recommendation from Dr Pearl     CCs notified to help schedule FNA biopsy.     Provider notified as latrell.   Tressa Robbins RN on 2/6/2024 at 10:19 AM       RE    Thyroid ultrasound findings from 4/2023 showed change in the characteristics/(how it looks) of the left sided thyroid nodule compared to 2019. I recommend biopsy of this nodule due to this change.   The thyroid blood work is normal but this not related to the thyroid nodule mentioned above.  Donavon Pearl MD

## 2024-03-19 ENCOUNTER — OFFICE VISIT (OUTPATIENT)
Dept: SURGERY | Facility: CLINIC | Age: 49
End: 2024-03-19
Attending: NURSE PRACTITIONER
Payer: COMMERCIAL

## 2024-03-19 ENCOUNTER — PRE VISIT (OUTPATIENT)
Dept: SURGERY | Facility: CLINIC | Age: 49
End: 2024-03-19

## 2024-03-19 VITALS
OXYGEN SATURATION: 98 % | SYSTOLIC BLOOD PRESSURE: 122 MMHG | HEART RATE: 89 BPM | BODY MASS INDEX: 19.29 KG/M2 | WEIGHT: 120 LBS | DIASTOLIC BLOOD PRESSURE: 80 MMHG | HEIGHT: 66 IN

## 2024-03-19 DIAGNOSIS — K64.8 INTERNAL HEMORRHOIDS: Primary | ICD-10-CM

## 2024-03-19 DIAGNOSIS — K64.5 THROMBOSED EXTERNAL HEMORRHOIDS: ICD-10-CM

## 2024-03-19 PROCEDURE — 46600 DIAGNOSTIC ANOSCOPY SPX: CPT | Performed by: NURSE PRACTITIONER

## 2024-03-19 PROCEDURE — 99203 OFFICE O/P NEW LOW 30 MIN: CPT | Mod: 25 | Performed by: NURSE PRACTITIONER

## 2024-03-19 ASSESSMENT — PAIN SCALES - GENERAL: PAINLEVEL: NO PAIN (0)

## 2024-03-19 NOTE — LETTER
"3/19/2024       RE: Nila Agarwal  [address]  [address]  [facility] [address]     Dear Colleague,    Thank you for referring your patient, Nila Agarwal, to the [facility] COLON AND RECTAL SURGERY [facility]IN[facility] [place] at [facility]. Please see a copy of my visit note below.    Colon and Rectal Surgery Consult Clinic Note    Date: 3/19/2024     Referring provider:  TATI Lindquist [facility]  [address] [place] [address]     RE: Nila Agarwal  : 1975  BRIANNA: 3/19/2024    Nila Agarwal is a very pleasant 49 year old female here for hemorrhoids.    HPI:  Was in the ED in January with thrombosed external hemorrhoids. She reports that she had bright red blood after pushing hard with a bowel movement. Bowel movements are now soft. No further bleeding now. This only happened one. Stools can occasionally be harder depending on what she eats.   Has never had a colonoscopy but refuses at this time.     Physical Examination:  /80 (BP Location: Left arm, Patient Position: Sitting, Cuff Size: Adult Regular)   Pulse 89   Ht 5' 6\"   Wt 120 lb   SpO2 98%   BMI 19.37 kg/m    General: alert, oriented, in no acute distress, sitting comfortably  HEENT: mucous membranes moist    Perianal external examination: Exam was chaperoned by BHAVNA Bello   Perianal skin: Intact with no excoriation or lichenification.  Lesions: No evidence of an external lesion, nodularity, or induration in the perianal region.  Eversion of buttocks: There was not evidence of an anal fissure. Details: N/A.  Skin tags or external hemorrhoids: Yes: external hemorrhoidal skin tags.    Digital rectal examination: Was performed.   Sphincter tone: Good.  Palpable lesions: No.  Other: None.  Bimanual examination: was not performed    Anoscopy: Was performed.   Hemorrhoids: Yes. Grade 2 internal hemorrhoids without active bleeding  Lesions: " No    Assessment/Plan: 49 year old female with internal hemorrhoids. One episode of bleeding in January but none since. Advised Starting a daily fiber supplement. Return to clinic if pain or bleeding returns. Strongly recommended screening colonoscopy but she declines at this time.     Medical history:  Past Medical History:   Diagnosis Date    Chronic rhinitis 1995    External hemorrhoids with other complication 2011    Fatigue 2012       Surgical history:  Past Surgical History:   Procedure Laterality Date    no previous surgeries         Problem list:    Patient Active Problem List    Diagnosis Date Noted    Dental infection 04/10/2019     Priority: Medium    Neutropenia (H24) 11/23/2014     Priority: Medium     Saw heme-onc 2014 - they rec follow up yearly, if develops other low counts (including reticulocyemia) will need bone marrow biopsy          Thyroid nodule 10/13/2014     Priority: Medium     Called Nila for Thyroid Ultrasound results:  6 mm thyroid nodule - reviewed recommendations and they would not do FNA unless 1 cm or bigger.  No fam hx of thyroid cancer and no personal hx of radiation to head or neck.  Rechecked 6 months - stable, no change or growth.  2019: Recheck - had grown.  FNA done, benign.  Plan recheck thyroid ultrasound 1 year.            Vitamin D deficiency 12/11/2013     Priority: Medium     12/2013 - start vitamin D 2000 IU daily; recheck in February 2014      Chronic rhinitis 06/19/2012     Priority: Medium    External hemorrhoids with other complication 06/19/2012     Priority: Medium    Fatigue 06/19/2012     Priority: Medium       Medications:  Current Outpatient Medications   Medication Sig Dispense Refill    hydrocortisone 2.5 % cream Apply topically 2 times daily 30 g 0    sennosides (SENOKOT) 8.6 MG tablet Take 1 tablet by mouth 2 times daily as needed for constipation 20 tablet 0    witch hazel-glycerin (TUCKS) pad Apply topically as needed for hemorrhoids 100 each 0     "vitamin D3 (CHOLECALCIFEROL) 2000 units (50 mcg) tablet Take 1 tablet (2,000 Units) by mouth daily (Patient not taking: Reported on 3/19/2024) 90 tablet 11       Allergies:  Allergies   Allergen Reactions    Seasonal Allergies        Family history:  Family History   Problem Relation Age of Onset    Family History Negative No family hx of        Social history:  Social History     Tobacco Use    Smoking status: Never    Smokeless tobacco: Never   Substance Use Topics    Alcohol use: No    Marital status: .    Nursing Notes:   Judy Fontenot  3/19/2024  2:12 PM  Signed  Chief Complaint   Patient presents with    New Patient     Possible hemorrhoids       Vitals:    03/19/24 1408   BP: 122/80   BP Location: Left arm   Patient Position: Sitting   Cuff Size: Adult Regular   Pulse: 89   SpO2: 98%   Weight: 54.4 kg (120 lb)   Height: 1.676 m (5' 6\")       Body mass index is 19.37 kg/m .       BHAVNA Bello       15 minutes spent on the date of encounter performing chart review, history and exam, documentation and further activities as noted above with an additional 2 minutes for anoscopy.     This note was created using speech recognition software and may contain unintended word substitutions.        Again, thank you for allowing me to participate in the care of your patient.      Sincerely,    TATI Ag CNP    "

## 2024-03-19 NOTE — PATIENT INSTRUCTIONS
Start a daily fiber supplement such as Citrucel or Metamucil. Start with once a day and slowly increase up to three times a day, if needed, over the next 4-6 weeks  Follow back up if bleeding or pain returns  Strongly recommend a screening colonoscopy

## 2024-03-19 NOTE — NURSING NOTE
"Chief Complaint   Patient presents with    New Patient     Possible hemorrhoids       Vitals:    03/19/24 1408   BP: 122/80   BP Location: Left arm   Patient Position: Sitting   Cuff Size: Adult Regular   Pulse: 89   SpO2: 98%   Weight: 54.4 kg (120 lb)   Height: 1.676 m (5' 6\")       Body mass index is 19.37 kg/m .                          Judy Fontenot EMT    "

## 2024-03-19 NOTE — PROGRESS NOTES
"Colon and Rectal Surgery Consult Clinic Note    Date: 3/19/2024     Referring provider:  TATI Lindquist CNP  [address] [place] [address]     RE: Nila Agarwal  : 1975  BRIANNA: 3/19/2024    Nila Agarwal is a very pleasant 49 year old female here for hemorrhoids.    HPI:  Was in the ED in January with thrombosed external hemorrhoids. She reports that she had bright red blood after pushing hard with a bowel movement. Bowel movements are now soft. No further bleeding now. This only happened one. Stools can occasionally be harder depending on what she eats.   Has never had a colonoscopy but refuses at this time.     Physical Examination:  /80 (BP Location: Left arm, Patient Position: Sitting, Cuff Size: Adult Regular)   Pulse 89   Ht 5' 6\"   Wt 120 lb   SpO2 98%   BMI 19.37 kg/m    General: alert, oriented, in no acute distress, sitting comfortably  HEENT: mucous membranes moist    Perianal external examination: Exam was chaperoned by BHAVNA Bello   Perianal skin: Intact with no excoriation or lichenification.  Lesions: No evidence of an external lesion, nodularity, or induration in the perianal region.  Eversion of buttocks: There was not evidence of an anal fissure. Details: N/A.  Skin tags or external hemorrhoids: Yes: external hemorrhoidal skin tags.    Digital rectal examination: Was performed.   Sphincter tone: Good.  Palpable lesions: No.  Other: None.  Bimanual examination: was not performed    Anoscopy: Was performed.   Hemorrhoids: Yes. Grade 2 internal hemorrhoids without active bleeding  Lesions: No    Assessment/Plan: 49 year old female with internal hemorrhoids. One episode of bleeding in January but none since. Advised Starting a daily fiber supplement. Return to clinic if pain or bleeding returns. Strongly recommended screening colonoscopy but she declines at this time.     Medical history:  Past Medical History:   Diagnosis Date    Chronic rhinitis " 1995    External hemorrhoids with other complication 2011    Fatigue 2012       Surgical history:  Past Surgical History:   Procedure Laterality Date    no previous surgeries         Problem list:    Patient Active Problem List    Diagnosis Date Noted    Dental infection 04/10/2019     Priority: Medium    Neutropenia (H24) 11/23/2014     Priority: Medium     Saw heme-onc 2014 - they rec follow up yearly, if develops other low counts (including reticulocyemia) will need bone marrow biopsy          Thyroid nodule 10/13/2014     Priority: Medium     Called Nila for Thyroid Ultrasound results:  6 mm thyroid nodule - reviewed recommendations and they would not do FNA unless 1 cm or bigger.  No fam hx of thyroid cancer and no personal hx of radiation to head or neck.  Rechecked 6 months - stable, no change or growth.  2019: Recheck - had grown.  FNA done, benign.  Plan recheck thyroid ultrasound 1 year.            Vitamin D deficiency 12/11/2013     Priority: Medium     12/2013 - start vitamin D 2000 IU daily; recheck in February 2014      Chronic rhinitis 06/19/2012     Priority: Medium    External hemorrhoids with other complication 06/19/2012     Priority: Medium    Fatigue 06/19/2012     Priority: Medium       Medications:  Current Outpatient Medications   Medication Sig Dispense Refill    hydrocortisone 2.5 % cream Apply topically 2 times daily 30 g 0    sennosides (SENOKOT) 8.6 MG tablet Take 1 tablet by mouth 2 times daily as needed for constipation 20 tablet 0    witch hazel-glycerin (TUCKS) pad Apply topically as needed for hemorrhoids 100 each 0    vitamin D3 (CHOLECALCIFEROL) 2000 units (50 mcg) tablet Take 1 tablet (2,000 Units) by mouth daily (Patient not taking: Reported on 3/19/2024) 90 tablet 11       Allergies:  Allergies   Allergen Reactions    Seasonal Allergies        Family history:  Family History   Problem Relation Age of Onset    Family History Negative No family hx of        Social  "history:  Social History     Tobacco Use    Smoking status: Never    Smokeless tobacco: Never   Substance Use Topics    Alcohol use: No    Marital status: .    Nursing Notes:   Judy Fontenot  3/19/2024  2:12 PM  Signed  Chief Complaint   Patient presents with    New Patient     Possible hemorrhoids       Vitals:    03/19/24 1408   BP: 122/80   BP Location: Left arm   Patient Position: Sitting   Cuff Size: Adult Regular   Pulse: 89   SpO2: 98%   Weight: 54.4 kg (120 lb)   Height: 1.676 m (5' 6\")       Body mass index is 19.37 kg/m .                          Judy Fontenot, EMT       15 minutes spent on the date of encounter performing chart review, history and exam, documentation and further activities as noted above with an additional 2 minutes for anoscopy.     TATI Anderson, NP-C  Colon and Rectal Surgery   [facility]    This note was created using speech recognition software and may contain unintended word substitutions.    "

## 2024-09-11 ENCOUNTER — OFFICE VISIT (OUTPATIENT)
Dept: FAMILY MEDICINE | Facility: CLINIC | Age: 49
End: 2024-09-11
Payer: COMMERCIAL

## 2024-09-11 VITALS
DIASTOLIC BLOOD PRESSURE: 65 MMHG | OXYGEN SATURATION: 98 % | BODY MASS INDEX: 30.55 KG/M2 | HEART RATE: 95 BPM | TEMPERATURE: 98.1 F | WEIGHT: 190.1 LBS | RESPIRATION RATE: 16 BRPM | HEIGHT: 66 IN | SYSTOLIC BLOOD PRESSURE: 93 MMHG

## 2024-09-11 DIAGNOSIS — Z12.31 VISIT FOR SCREENING MAMMOGRAM: ICD-10-CM

## 2024-09-11 DIAGNOSIS — E04.1 THYROID NODULE: ICD-10-CM

## 2024-09-11 DIAGNOSIS — K62.5 RECTAL BLEEDING: Primary | ICD-10-CM

## 2024-09-11 DIAGNOSIS — Z31.9 PATIENT DESIRES PREGNANCY: ICD-10-CM

## 2024-09-11 DIAGNOSIS — Z23 NEED FOR HEPATITIS B VACCINATION: ICD-10-CM

## 2024-09-11 DIAGNOSIS — R53.82 CHRONIC FATIGUE: ICD-10-CM

## 2024-09-11 DIAGNOSIS — Z23 NEED FOR DIPHTHERIA-TETANUS-PERTUSSIS (TDAP) VACCINE: ICD-10-CM

## 2024-09-11 LAB
ALBUMIN SERPL BCG-MCNC: 4.3 G/DL (ref 3.5–5.2)
ALP SERPL-CCNC: 72 U/L (ref 40–150)
ALT SERPL W P-5'-P-CCNC: 13 U/L (ref 0–50)
ANION GAP SERPL CALCULATED.3IONS-SCNC: 11 MMOL/L (ref 7–15)
AST SERPL W P-5'-P-CCNC: 29 U/L (ref 0–45)
BILIRUB SERPL-MCNC: 0.3 MG/DL
BUN SERPL-MCNC: 15 MG/DL (ref 6–20)
CALCIUM SERPL-MCNC: 9.8 MG/DL (ref 8.8–10.4)
CHLORIDE SERPL-SCNC: 101 MMOL/L (ref 98–107)
CHOLEST SERPL-MCNC: 296 MG/DL
CREAT SERPL-MCNC: 0.72 MG/DL (ref 0.51–0.95)
EGFRCR SERPLBLD CKD-EPI 2021: >90 ML/MIN/1.73M2
ERYTHROCYTE [DISTWIDTH] IN BLOOD BY AUTOMATED COUNT: 11.8 % (ref 10–15)
FASTING STATUS PATIENT QL REPORTED: ABNORMAL
FASTING STATUS PATIENT QL REPORTED: ABNORMAL
GLUCOSE SERPL-MCNC: 107 MG/DL (ref 70–99)
HBA1C MFR BLD: 5.4 % (ref 0–5.6)
HCO3 SERPL-SCNC: 25 MMOL/L (ref 22–29)
HCT VFR BLD AUTO: 38.8 % (ref 35–47)
HDLC SERPL-MCNC: 46 MG/DL
HGB BLD-MCNC: 13.2 G/DL (ref 11.7–15.7)
LDLC SERPL CALC-MCNC: 184 MG/DL
MCH RBC QN AUTO: 30.2 PG (ref 26.5–33)
MCHC RBC AUTO-ENTMCNC: 34 G/DL (ref 31.5–36.5)
MCV RBC AUTO: 89 FL (ref 78–100)
NONHDLC SERPL-MCNC: 250 MG/DL
PLATELET # BLD AUTO: 262 10E3/UL (ref 150–450)
POTASSIUM SERPL-SCNC: 4.4 MMOL/L (ref 3.4–5.3)
PROT SERPL-MCNC: 7.6 G/DL (ref 6.4–8.3)
RBC # BLD AUTO: 4.37 10E6/UL (ref 3.8–5.2)
SODIUM SERPL-SCNC: 137 MMOL/L (ref 135–145)
T3FREE SERPL-MCNC: 3.1 PG/ML (ref 2–4.4)
T4 FREE SERPL-MCNC: 1.1 NG/DL (ref 0.9–1.7)
TRIGL SERPL-MCNC: 329 MG/DL
TSH SERPL DL<=0.005 MIU/L-ACNC: 1.4 UIU/ML (ref 0.3–4.2)
WBC # BLD AUTO: 4.7 10E3/UL (ref 4–11)

## 2024-09-11 PROCEDURE — 84481 FREE ASSAY (FT-3): CPT | Performed by: FAMILY MEDICINE

## 2024-09-11 PROCEDURE — 90472 IMMUNIZATION ADMIN EACH ADD: CPT | Performed by: FAMILY MEDICINE

## 2024-09-11 PROCEDURE — 90746 HEPB VACCINE 3 DOSE ADULT IM: CPT | Performed by: FAMILY MEDICINE

## 2024-09-11 PROCEDURE — 99204 OFFICE O/P NEW MOD 45 MIN: CPT | Mod: 25 | Performed by: FAMILY MEDICINE

## 2024-09-11 PROCEDURE — 85027 COMPLETE CBC AUTOMATED: CPT | Performed by: FAMILY MEDICINE

## 2024-09-11 PROCEDURE — 84443 ASSAY THYROID STIM HORMONE: CPT | Performed by: FAMILY MEDICINE

## 2024-09-11 PROCEDURE — 83036 HEMOGLOBIN GLYCOSYLATED A1C: CPT | Performed by: FAMILY MEDICINE

## 2024-09-11 PROCEDURE — 90471 IMMUNIZATION ADMIN: CPT | Performed by: FAMILY MEDICINE

## 2024-09-11 PROCEDURE — 90715 TDAP VACCINE 7 YRS/> IM: CPT | Performed by: FAMILY MEDICINE

## 2024-09-11 PROCEDURE — 80061 LIPID PANEL: CPT | Performed by: FAMILY MEDICINE

## 2024-09-11 PROCEDURE — 36415 COLL VENOUS BLD VENIPUNCTURE: CPT | Performed by: FAMILY MEDICINE

## 2024-09-11 PROCEDURE — 80053 COMPREHEN METABOLIC PANEL: CPT | Performed by: FAMILY MEDICINE

## 2024-09-11 PROCEDURE — 84439 ASSAY OF FREE THYROXINE: CPT | Performed by: FAMILY MEDICINE

## 2024-09-11 RX ORDER — POLYETHYLENE GLYCOL 3350 17 G/17G
1 POWDER, FOR SOLUTION ORAL DAILY
Qty: 850 G | Refills: 3 | Status: SHIPPED | OUTPATIENT
Start: 2024-09-11

## 2024-09-11 NOTE — LETTER
September 12, 2024      Nila Agarwal  [address]  [facility] [address]        Dear Nila,    Thank you for getting your care at [facility]. Please see below for your test results.    Resulted Orders   Lipid panel reflex to direct LDL Non-fasting   Result Value Ref Range    Cholesterol 296 (H) <200 mg/dL    Triglycerides 329 (H) <150 mg/dL    Direct Measure HDL 46 (L) >=50 mg/dL    LDL Cholesterol Calculated 184 (H) <100 mg/dL    Non HDL Cholesterol 250 (H) <130 mg/dL    Patient Fasting > 8hrs? Unknown     Narrative    Cholesterol  Desirable: < 200 mg/dL  Borderline High: 200 - 239 mg/dL  High: >= 240 mg/dL    Triglycerides  Normal: < 150 mg/dL  Borderline High: 150 - 199 mg/dL  High: 200-499 mg/dL  Very High: >= 500 mg/dL    Direct Measure HDL  Female: >= 50 mg/dL   Male: >= 40 mg/dL    LDL Cholesterol  Desirable: < 100 mg/dL  Above Desirable: 100 - 129 mg/dL   Borderline High: 130 - 159 mg/dL   High:  160 - 189 mg/dL   Very High: >= 190 mg/dL    Non HDL Cholesterol  Desirable: < 130 mg/dL  Above Desirable: 130 - 159 mg/dL  Borderline High: 160 - 189 mg/dL  High: 190 - 219 mg/dL  Very High: >= 220 mg/dL   CBC with Platelets   Result Value Ref Range    WBC Count 4.7 4.0 - 11.0 10e3/uL    RBC Count 4.37 3.80 - 5.20 10e6/uL    Hemoglobin 13.2 11.7 - 15.7 g/dL    Hematocrit 38.8 35.0 - 47.0 %    MCV 89 78 - 100 fL    MCH 30.2 26.5 - 33.0 pg    MCHC 34.0 31.5 - 36.5 g/dL    RDW 11.8 10.0 - 15.0 %    Platelet Count 262 150 - 450 10e3/uL   Comprehensive metabolic panel   Result Value Ref Range    Sodium 137 135 - 145 mmol/L    Potassium 4.4 3.4 - 5.3 mmol/L    Carbon Dioxide (CO2) 25 22 - 29 mmol/L    Anion Gap 11 7 - 15 mmol/L    Urea Nitrogen 15.0 6.0 - 20.0 mg/dL    Creatinine 0.72 0.51 - 0.95 mg/dL    GFR Estimate >90 >60 mL/min/1.73m2      Comment:      eGFR calculated using 2021 CKD-EPI equation.    Calcium 9.8 8.8 - 10.4 mg/dL      Comment:      Reference intervals for this test were  updated on 7/16/2024 to reflect our healthy population more accurately. There may be differences in the flagging of prior results with similar values performed with this method. Those prior results can be interpreted in the context of the updated reference intervals.    Chloride 101 98 - 107 mmol/L    Glucose 107 (H) 70 - 99 mg/dL    Alkaline Phosphatase 72 40 - 150 U/L    AST 29 0 - 45 U/L    ALT 13 0 - 50 U/L    Protein Total 7.6 6.4 - 8.3 g/dL    Albumin 4.3 3.5 - 5.2 g/dL    Bilirubin Total 0.3 <=1.2 mg/dL    Patient Fasting > 8hrs? Unknown    T4 free   Result Value Ref Range    Free T4 1.10 0.90 - 1.70 ng/dL   T3 Free   Result Value Ref Range    T3 Free 3.1 2.0 - 4.4 pg/mL   Hemoglobin A1c   Result Value Ref Range    Hemoglobin A1C 5.4 0.0 - 5.6 %      Comment:      Normal <5.7%   Prediabetes 5.7-6.4%    Diabetes 6.5% or higher     Note: Adopted from ADA consensus guidelines.   TSH   Result Value Ref Range    TSH 1.40 0.30 - 4.20 uIU/mL     Your blood work shows high cholesterol, otherwise results are within normal limits.     Please call the clinic for a clinic appointment to further reveiw these results.    Sincerely,    Lovely Hughes MD

## 2024-09-11 NOTE — PROGRESS NOTES
Assessment & Plan     Rectal bleeding  Chronic fatigue  Patient presents to establish care.  Longstanding history of intermittent bright red bleeding per rectum.  Patient had anoscopy done in March 2024, suggestive of internal hemorrhoids.  Discussed with patient that treatment involves laxatives to help prevent constipation or prolonged straining.  MiraLAX sent to pharmacy.  Also reviewed recommendation to get colonoscopy, referral placed.  Blood work ordered to rule out any anemia.  Patient hemodynamically stable.  - Lipid panel reflex to direct LDL Non-fasting; Future  - CBC with Platelets; Future  - Comprehensive metabolic panel; Future  - Hemoglobin A1c; Future  - Adult GI  Referral - Procedure Only; Future  - polyethylene glycol (MIRALAX) 17 GM/Dose powder; Take 17 g (1 Capful) by mouth daily.  - Lipid panel reflex to direct LDL Non-fasting  - CBC with Platelets  - Comprehensive metabolic panel  - Hemoglobin A1c    Patient desires pregnancy  Patient desires pregnancy.  Per patient, her actual age is 42 years old not 49 as stated in chart.  1 successful pregnancy 13 years ago.  Reports menstrual cycles are regular and normal.  She does have a history of thyroid nodule, repeat thyroid studies, thyroid ultrasound ordered today.  Referral placed to OB/GYN for further evaluation and management.  - Ob/Gyn  Referral; Future    Thyroid nodule  S/p FNA C in 2019.  Results were benign. Ultrasound April 2023 showed thyroid nodule TI-RADS 5.  Repeat ultrasound ordered to look for interval change.  - US Thyroid; Future  - TSH with free T4 reflex; Future  - T4 free; Future  - T3 Free; Future    Need for diphtheria-tetanus-pertussis (Tdap) vaccine  - TDAP VACCINE (Adacel, Boostrix)  [[id number]]    Visit for screening mammogram  - MA Screening Bilateral w/ [facility]; Future    Need for hepatitis B vaccination  - HEPATITIS B VACCINE,ADULT,IM    BMI  Estimated body mass index is 30.68 kg/m  as calculated from the  "following:    Height as of this encounter: 1.676 m (5' 6\").    Weight as of this encounter: 86.2 kg (190 lb 1.6 oz).     Return in about 4 weeks (around 10/9/2024) for Follow up.    Sony Dotson is a 49 year old, presenting for the following health issues:  New Patient (Establishing care with new PCP; Transfer from [facility] ), Rectal Problem (Bleeding and hemorrhoids for several months. ), and Orders (Cholesterol and Thyroid )        9/11/2024     9:36 AM   Additional Questions   Roomed by Keyur   Accompanied by Self         9/11/2024    Information    services provided? No        HPI   Patient is here to establish care.  She reports having bleeding from her rectum off-and-on for a few years now.  She was in the emergency room earlier this year with similar symptoms.  Was referred to colon surgery.  She was told she had internal hemorrhoids.  She does report having occasionally hard bowel movements and also endorses sometimes having to push really hard to have a bowel movement.  Yesterday, she reports having 3 bowel movements with some blood in it.  She reports her symptoms are worse around her menstrual bleeding.  She is not currently taking any laxatives.  She also endorses feeling tired. No lightheadedness. No chest pain, belly pain, vomiting, cough.     Sometimes has nausea. Has seasonal allergies and sore throat sometimes.     Has been gaining weight. Does not exercise.  She also admits that she is not paying attention to healthy food choices.     Desires pregnancy. Normal menstrual cycles. Has 1 daughter who is 13 yrs old. She is actually 42 yrs old not 49 as reported in chart. She went to [place] and got testing done, she was told her hormone levels are low.  lives in [place].  She desires another pregnancy, would like to get workup done for that.    She reports having thyroid nodule long-term.  Reports that she had biopsy done 5 years ago and was told everything was " "normal.  She had an ultrasound done last year, has not had a follow-up ultrasound done since.      Objective    BP 93/65   Pulse 95   Temp 98.1  F (36.7  C) (Temporal)   Resp 16   Ht 1.676 m (5' 6\")   Wt 86.2 kg (190 lb 1.6 oz)   LMP 08/19/2024 (Exact Date)   SpO2 98%   BMI 30.68 kg/m    Body mass index is 30.68 kg/m .  Physical Exam  Constitutional:       Appearance: Normal appearance.   HENT:      Head: Normocephalic and atraumatic.      Right Ear: External ear normal.      Left Ear: External ear normal.   Eyes:      Extraocular Movements: Extraocular movements intact.      Conjunctiva/sclera: Conjunctivae normal.   Cardiovascular:      Rate and Rhythm: Normal rate.   Pulmonary:      Effort: Pulmonary effort is normal.   Musculoskeletal:      Cervical back: Normal range of motion.   Neurological:      General: No focal deficit present.      Mental Status: She is alert.   Psychiatric:         Mood and Affect: Mood normal.         Thought Content: Thought content normal.            Signed Electronically by: Lovely Hughes MD    "

## 2024-09-13 ENCOUNTER — TELEPHONE (OUTPATIENT)
Dept: FAMILY MEDICINE | Facility: CLINIC | Age: 49
End: 2024-09-13
Payer: COMMERCIAL

## 2024-09-13 NOTE — TELEPHONE ENCOUNTER
9/13/24    CC contacted patient to schedule an US ordered by Dr. Hughes on 9/11. Patient is scheduled for US on 9/25 at 8am and scheduled for follow up with Dr. Hughes on 10/8.    During phone call patient stated she needed to call 911, then asked CC to call 911 for her. CC asked what was happening. Patient stated there was loud knocking at her door, patient seemed very frightened and had lowered her voice. CC asked if she was afraid and asked if she felt like she was in danger/in harm. Patient said yes and said it was ok for CC to hang up and call 911 for her.    CC proceeded to dial 911, gave  patients name, address, and phone number and explained the situation.  stated they would call the patient and then took down CC information.    CC called patient around 2:20pm to check in and make sure she was ok. Patient stated the police came and she was safe. Thanked CC for the call and help.    Judy Roach  Care Coordinator- [facility]  [phone]

## 2024-09-16 ENCOUNTER — OFFICE VISIT (OUTPATIENT)
Dept: FAMILY MEDICINE | Facility: CLINIC | Age: 49
End: 2024-09-16
Payer: COMMERCIAL

## 2024-09-16 VITALS
HEART RATE: 95 BPM | RESPIRATION RATE: 16 BRPM | HEIGHT: 66 IN | TEMPERATURE: 98.3 F | OXYGEN SATURATION: 98 % | SYSTOLIC BLOOD PRESSURE: 109 MMHG | BODY MASS INDEX: 30.25 KG/M2 | DIASTOLIC BLOOD PRESSURE: 74 MMHG | WEIGHT: 188.2 LBS

## 2024-09-16 DIAGNOSIS — M62.81 GENERALIZED MUSCLE WEAKNESS: Primary | ICD-10-CM

## 2024-09-16 DIAGNOSIS — J02.9 SORE THROAT: ICD-10-CM

## 2024-09-16 DIAGNOSIS — E78.00 ELEVATED LDL CHOLESTEROL LEVEL: ICD-10-CM

## 2024-09-16 DIAGNOSIS — J30.1 ALLERGIC RHINITIS DUE TO POLLEN, UNSPECIFIED SEASONALITY: ICD-10-CM

## 2024-09-16 LAB
DEPRECATED S PYO AG THROAT QL EIA: NEGATIVE
FASTING STATUS PATIENT QL REPORTED: NO
FLUAV RNA SPEC QL NAA+PROBE: NEGATIVE
FLUBV RNA RESP QL NAA+PROBE: NEGATIVE
GLUCOSE SERPL-MCNC: 90 MG/DL (ref 70–99)
GROUP A STREP BY PCR: NOT DETECTED
HCG UR QL: NEGATIVE
RSV RNA SPEC NAA+PROBE: NEGATIVE
SARS-COV-2 RNA RESP QL NAA+PROBE: NEGATIVE

## 2024-09-16 PROCEDURE — 82947 ASSAY GLUCOSE BLOOD QUANT: CPT

## 2024-09-16 PROCEDURE — 81025 URINE PREGNANCY TEST: CPT

## 2024-09-16 PROCEDURE — 99214 OFFICE O/P EST MOD 30 MIN: CPT | Mod: GC

## 2024-09-16 PROCEDURE — 87651 STREP A DNA AMP PROBE: CPT

## 2024-09-16 PROCEDURE — 87637 SARSCOV2&INF A&B&RSV AMP PRB: CPT

## 2024-09-16 PROCEDURE — 36415 COLL VENOUS BLD VENIPUNCTURE: CPT

## 2024-09-16 RX ORDER — LORATADINE 10 MG/1
10 TABLET ORAL DAILY
Qty: 30 TABLET | Refills: 3 | Status: SHIPPED | OUTPATIENT
Start: 2024-09-16

## 2024-09-16 NOTE — PROGRESS NOTES
Assessment & Plan     Generalized muscle weakness  For 3 days. CBC and CMP and A1C normal 5 days ago, no need to recheck. Request BG check, will do this today. Also requests pregnancy test. Chance of pregnancy low with menstrual period in the last month, will check for patient assurance.   - Glucose; Future  - HCG qualitative urine; Future  - Glucose  - HCG qualitative urine    Sore throat  For 3 days. Likely side effect of vaccination, but will check the below. HEENT exam normal.   - Symptomatic COVID-19 Virus (Coronavirus) by PCR Nose  - Influenza A & B Antigen - Clinic Collect  - Streptococcus A Rapid Screen w/Reflex to PCR      Allergic rhinitis due to pollen, unspecified seasonality  -     loratadine (CLARITIN) 10 MG tablet; Take 1 tablet (10 mg) by mouth daily.    Elevated LDL cholesterol level  Counseling performed today: low cholesterol diet, increase in exercise (walking). LDL below threshold for strong recommendation of statin. No hxo DM, ASCVD <5%. Would suggest starting if persistently elevated in 6 mo or fhx of premature cardiac event (did not discuss)          Return if symptoms worsen or fail to improve.        Sony Dotson is a 49 year old, presenting for the following health issues:  sore throat (Sore throat started after receiving a vaccine on 9/11/24, right foot has been causing pain,pt fell a few years ago and can't sit on her tailbone without pain)        9/16/2024    11:39 AM   Additional Questions   Roomed by Ynes   Accompanied by Self         9/16/2024    Information    services provided? No        Via the Health Maintenance questionnaire, the patient has reported the following services have been completed -Mammogram: In [facility] 2023-06-14, this information has been sent to the abstraction team.  HPI   Weakness, fatigue since vaccine on Friday  sore throat (Sore throat started after receiving a vaccine on 9/11/24    right foot has been causing pain,pt fell a few  "years ago and can't sit on her tailbone without pain): defer to next visit    The 10-year ASCVD risk score (Hernan REYES, et al., 2019) is: 1.3%    Values used to calculate the score:      Age: 49 years      Sex: Female      Is Non- : Yes      Diabetic: No      Tobacco smoker: No      Systolic Blood Pressure: 109 mmHg      Is BP treated: No      HDL Cholesterol: 46 mg/dL      Total Cholesterol: 296 mg/dL                    Objective    /74   Pulse 95   Temp 98.3  F (36.8  C) (Oral)   Resp 16   Ht 1.676 m (5' 6\")   Wt 85.4 kg (188 lb 3.2 oz)   LMP 08/19/2024 (Exact Date)   SpO2 98%   BMI 30.38 kg/m    Body mass index is 30.38 kg/m .  Physical Exam  HENT:      Right Ear: Ear canal normal.      Left Ear: Ear canal normal.      Ears:      Comments: Scarred TM on left     Nose: Congestion and rhinorrhea present.      Mouth/Throat:      Pharynx: No oropharyngeal exudate or posterior oropharyngeal erythema.   Eyes:      Conjunctiva/sclera: Conjunctivae normal.   Cardiovascular:      Rate and Rhythm: Normal rate.   Pulmonary:      Effort: Pulmonary effort is normal.   Musculoskeletal:         General: No swelling.      Cervical back: Neck supple. No rigidity.   Lymphadenopathy:      Cervical: No cervical adenopathy.   Neurological:      Mental Status: She is alert.          Office Visit on 09/11/2024   Component Date Value Ref Range Status    Cholesterol 09/11/2024 296 (H)  <200 mg/dL Final    Triglycerides 09/11/2024 329 (H)  <150 mg/dL Final    Direct Measure HDL 09/11/2024 46 (L)  >=50 mg/dL Final    LDL Cholesterol Calculated 09/11/2024 184 (H)  <100 mg/dL Final    Non HDL Cholesterol 09/11/2024 250 (H)  <130 mg/dL Final    Patient Fasting > 8hrs? 09/11/2024 Unknown   Final    WBC Count 09/11/2024 4.7  4.0 - 11.0 10e3/uL Final    RBC Count 09/11/2024 4.37  3.80 - 5.20 10e6/uL Final    Hemoglobin 09/11/2024 13.2  11.7 - 15.7 g/dL Final    Hematocrit 09/11/2024 38.8  35.0 - 47.0 % Final "    MCV 09/11/2024 89  78 - 100 fL Final    MCH 09/11/2024 30.2  26.5 - 33.0 pg Final    MCHC 09/11/2024 34.0  31.5 - 36.5 g/dL Final    RDW 09/11/2024 11.8  10.0 - 15.0 % Final    Platelet Count 09/11/2024 262  150 - 450 10e3/uL Final    Sodium 09/11/2024 137  135 - 145 mmol/L Final    Potassium 09/11/2024 4.4  3.4 - 5.3 mmol/L Final    Carbon Dioxide (CO2) 09/11/2024 25  22 - 29 mmol/L Final    Anion Gap 09/11/2024 11  7 - 15 mmol/L Final    Urea Nitrogen 09/11/2024 15.0  6.0 - 20.0 mg/dL Final    Creatinine 09/11/2024 0.72  0.51 - 0.95 mg/dL Final    GFR Estimate 09/11/2024 >90  >60 mL/min/1.73m2 Final    eGFR calculated using 2021 CKD-EPI equation.    Calcium 09/11/2024 9.8  8.8 - 10.4 mg/dL Final    Reference intervals for this test were updated on 7/16/2024 to reflect our healthy population more accurately. There may be differences in the flagging of prior results with similar values performed with this method. Those prior results can be interpreted in the context of the updated reference intervals.    Chloride 09/11/2024 101  98 - 107 mmol/L Final    Glucose 09/11/2024 107 (H)  70 - 99 mg/dL Final    Alkaline Phosphatase 09/11/2024 72  40 - 150 U/L Final    AST 09/11/2024 29  0 - 45 U/L Final    ALT 09/11/2024 13  0 - 50 U/L Final    Protein Total 09/11/2024 7.6  6.4 - 8.3 g/dL Final    Albumin 09/11/2024 4.3  3.5 - 5.2 g/dL Final    Bilirubin Total 09/11/2024 0.3  <=1.2 mg/dL Final    Patient Fasting > 8hrs? 09/11/2024 Unknown   Final    Free T4 09/11/2024 1.10  0.90 - 1.70 ng/dL Final    T3 Free 09/11/2024 3.1  2.0 - 4.4 pg/mL Final    Hemoglobin A1C 09/11/2024 5.4  0.0 - 5.6 % Final    Normal <5.7%   Prediabetes 5.7-6.4%    Diabetes 6.5% or higher     Note: Adopted from ADA consensus guidelines.    TSH 09/11/2024 1.40  0.30 - 4.20 uIU/mL Final           Signed Electronically by: Sujatha Arcos MD

## 2024-09-25 ENCOUNTER — ANCILLARY PROCEDURE (OUTPATIENT)
Dept: ULTRASOUND IMAGING | Facility: CLINIC | Age: 49
End: 2024-09-25
Attending: FAMILY MEDICINE
Payer: COMMERCIAL

## 2024-09-25 DIAGNOSIS — E04.1 THYROID NODULE: ICD-10-CM

## 2024-09-25 PROCEDURE — 76536 US EXAM OF HEAD AND NECK: CPT | Mod: GC | Performed by: RADIOLOGY

## 2024-09-26 ENCOUNTER — TELEPHONE (OUTPATIENT)
Dept: FAMILY MEDICINE | Facility: CLINIC | Age: 49
End: 2024-09-26
Payer: COMMERCIAL

## 2024-09-26 NOTE — CONFIDENTIAL NOTE
"Please call patient with interpretor for thyroid ultrasound results. Letter also sent    \"The results of you thyroid ultrasound are reassuring. Your thyroid nodule is the same size as before and looks less bad. You should have another thyroid ultrasound in 1 year to monitor. Keep your appointment on 10/8/24 with Dr. Hughes to discuss further\"    -Lexie Palma MD (on behalf of Dr. Hughes)        "

## 2024-09-27 ENCOUNTER — APPOINTMENT (OUTPATIENT)
Dept: INTERPRETER SERVICES | Facility: CLINIC | Age: 49
End: 2024-09-27
Payer: COMMERCIAL

## 2024-10-08 ENCOUNTER — OFFICE VISIT (OUTPATIENT)
Dept: FAMILY MEDICINE | Facility: CLINIC | Age: 49
End: 2024-10-08
Payer: COMMERCIAL

## 2024-10-08 VITALS
DIASTOLIC BLOOD PRESSURE: 80 MMHG | WEIGHT: 185.8 LBS | HEIGHT: 66 IN | TEMPERATURE: 98.4 F | SYSTOLIC BLOOD PRESSURE: 107 MMHG | HEART RATE: 89 BPM | BODY MASS INDEX: 29.86 KG/M2 | RESPIRATION RATE: 12 BRPM | OXYGEN SATURATION: 99 %

## 2024-10-08 DIAGNOSIS — M53.3 TAIL BONE PAIN: ICD-10-CM

## 2024-10-08 DIAGNOSIS — F41.1 GAD (GENERALIZED ANXIETY DISORDER): ICD-10-CM

## 2024-10-08 DIAGNOSIS — E78.2 MIXED HYPERLIPIDEMIA: ICD-10-CM

## 2024-10-08 DIAGNOSIS — M54.9 UPPER BACK PAIN: ICD-10-CM

## 2024-10-08 DIAGNOSIS — R55 SYNCOPE, UNSPECIFIED SYNCOPE TYPE: Primary | ICD-10-CM

## 2024-10-08 PROCEDURE — 99214 OFFICE O/P EST MOD 30 MIN: CPT | Performed by: FAMILY MEDICINE

## 2024-10-08 NOTE — PROGRESS NOTES
"  Assessment & Plan     Syncope, unspecified syncope type  Reviewed ED visit note, lab workup.  Per ED doctor assessment, presentation concerning for psychogenic nonepileptiform seizure event.  Vasovagal syncope also on the differential.  Referral to neurology placed.  Warning signs warranting immediate medical attention discussed with patient.  At this time, no alarm signs present to suggest any intracranial lesion or neurodegenerative disease.  Follow-up in a month for reevaluation  - Adult Neurology  Referral; Future    Mixed hyperlipidemia  The 10-year ASCVD risk score (Hernan REYES, et al., 2019) is: 1.2%    Values used to calculate the score:      Age: 49 years      Sex: Female      Is Non- : Yes      Diabetic: No      Tobacco smoker: No      Systolic Blood Pressure: 107 mmHg      Is BP treated: No      HDL Cholesterol: 46 mg/dL      Total Cholesterol: 296 mg/dL  Reviewed abnormal lipid panel results with patient.  Recommend working on lifestyle measures-increasing physical activity, modifying diet.  Handout given on Mediterranean diet.    JOSEPHINE (generalized anxiety disorder)  Due to concerns for recent stressors in life, mostly pertaining to housing, referral placed to behavioral health.  Strongly encouraged patient to start psychotherapy.  She will think about this.  - Adult Mental Health  Referral; Future    Upper back pain  Tail bone pain  No red flag signs present on evaluation today.  Recommend starting with physical therapy.  Symptomatic treatment reviewed with patient.  Follow-up in a month for reevaluation.  - Physical Therapy  Referral; Future    BMI  Estimated body mass index is 29.99 kg/m  as calculated from the following:    Height as of this encounter: 1.676 m (5' 6\").    Weight as of this encounter: 84.3 kg (185 lb 12.8 oz).     Return in about 4 weeks (around 11/5/2024) for Follow up.    Sony Dotson is a 49 year old, presenting for the " "following health issues:  RECHECK      10/8/2024     4:05 PM   Additional Questions   Roomed by debo   Accompanied by self         10/8/2024    Information    services provided? No        HPI   Patient is here to follow-up after ED visit.  She was seen in the emergency room yesterday after she fainted.  She says that she was at [facility] when she fainted lost consciousness.  She was taken to the emergency room.  She had a similar episode again in the emergency room.  She had blood work done in the emergency room and was told to follow-up with her PCP.  She says that she knows she does not drink enough water.  She has also not been watching her diet.  She says that her weight goes up and down.  Today, she reports being in her usual health.  She does report feeling tired a little bit and having pain in her upper back.  Otherwise, she denies any ongoing fever, headache, chest pain, cough, belly pain, vomiting, diarrhea or urinary concerns.  She does endorse having upper back pain and tailbone pain.  She reports that the pain started when she fell 2 years ago.  In addition, she endorses feeling very stressed out because of safety concerns in her neighborhood.  She reports episodes of witnessing unknown people outside her doorstep and in her neighborhood.  She is worried about gun safety in the neighborhood as well.  She has reported to authorities and says that the police is aware.  She is moving into a new neighborhood on November 1.        Objective    /80 (BP Location: Right arm, Patient Position: Sitting, Cuff Size: Adult Regular)   Pulse 89   Temp 98.4  F (36.9  C) (Oral)   Resp 12   Ht 1.676 m (5' 6\")   Wt 84.3 kg (185 lb 12.8 oz)   LMP 09/23/2024 (Exact Date)   SpO2 99%   BMI 29.99 kg/m    Body mass index is 29.99 kg/m .  Physical Exam  Constitutional:       Appearance: Normal appearance.   HENT:      Head: Normocephalic and atraumatic.      Right Ear: External ear normal.      " Left Ear: External ear normal.   Eyes:      Extraocular Movements: Extraocular movements intact.      Conjunctiva/sclera: Conjunctivae normal.   Cardiovascular:      Rate and Rhythm: Normal rate.   Pulmonary:      Effort: Pulmonary effort is normal.      Breath sounds: Normal breath sounds.   Musculoskeletal:      Cervical back: Normal range of motion.      Lumbar back: No tenderness or bony tenderness. Negative right straight leg raise test and negative left straight leg raise test.   Neurological:      General: No focal deficit present.      Mental Status: She is alert.   Psychiatric:         Mood and Affect: Mood normal.         Thought Content: Thought content normal.              Signed Electronically by: Lovely Hughes MD

## 2024-11-05 ENCOUNTER — APPOINTMENT (OUTPATIENT)
Dept: GENERAL RADIOLOGY | Facility: CLINIC | Age: 49
End: 2024-11-05
Attending: EMERGENCY MEDICINE
Payer: COMMERCIAL

## 2024-11-05 ENCOUNTER — HOSPITAL ENCOUNTER (EMERGENCY)
Facility: CLINIC | Age: 49
Discharge: HOME | End: 2024-11-05
Attending: EMERGENCY MEDICINE | Admitting: EMERGENCY MEDICINE
Payer: COMMERCIAL

## 2024-11-05 VITALS
HEART RATE: 70 BPM | BODY MASS INDEX: 29.73 KG/M2 | OXYGEN SATURATION: 99 % | WEIGHT: 185 LBS | SYSTOLIC BLOOD PRESSURE: 110 MMHG | DIASTOLIC BLOOD PRESSURE: 77 MMHG | RESPIRATION RATE: 18 BRPM | HEIGHT: 66 IN | TEMPERATURE: 97.9 F

## 2024-11-05 DIAGNOSIS — M25.571 PAIN IN JOINT, ANKLE AND FOOT, RIGHT: ICD-10-CM

## 2024-11-05 PROCEDURE — 99283 EMERGENCY DEPT VISIT LOW MDM: CPT | Performed by: EMERGENCY MEDICINE

## 2024-11-05 PROCEDURE — 73610 X-RAY EXAM OF ANKLE: CPT | Mod: RT

## 2024-11-05 PROCEDURE — 73610 X-RAY EXAM OF ANKLE: CPT | Mod: 26 | Performed by: RADIOLOGY

## 2024-11-05 ASSESSMENT — ACTIVITIES OF DAILY LIVING (ADL)
ADLS_ACUITY_SCORE: 0

## 2024-11-05 ASSESSMENT — COLUMBIA-SUICIDE SEVERITY RATING SCALE - C-SSRS
2. HAVE YOU ACTUALLY HAD ANY THOUGHTS OF KILLING YOURSELF IN THE PAST MONTH?: NO
6. HAVE YOU EVER DONE ANYTHING, STARTED TO DO ANYTHING, OR PREPARED TO DO ANYTHING TO END YOUR LIFE?: NO
1. IN THE PAST MONTH, HAVE YOU WISHED YOU WERE DEAD OR WISHED YOU COULD GO TO SLEEP AND NOT WAKE UP?: NO

## 2024-11-05 NOTE — ED NOTES
"ED Triage Provider Note  [facility]  Encounter Date: Nov 5, 2024    History:  Chief Complaint   Patient presents with    Ankle Pain     Nila Agarwal is a 49 year old female who presents to the ED with ***. {include 1-3 HPI elements}    Review of Systems:  {1 item required}    Exam:  /77   Pulse 70   Temp 97.9  F (36.6  C) (Oral)   Resp 18   Ht 1.676 m (5' 6\")   Wt 83.9 kg (185 lb)   LMP 09/23/2024 (Exact Date)   SpO2 99%   BMI 29.86 kg/m    General: No acute distress. Appears stated age.   Cardio: {normal/tachy/james:010270::\"normal\"} rate, extremities well perfused  Resp: Normal work of breathing, grossly normal respiratory rate  Neuro: Alert. Facial movement grossly symmetric. Grossly intact strength.   {edit exam as needed, may add problem pertinent system}    Medical Decision Making:  Patient arriving to the ED with problem as above. A medical screening exam was performed. {Initial differential:536313::\"Initial differential diagnosis includes but not limited to ***.\"}    *** orders initiated from Triage. The patient is most appropriate to {ED Triage Destination:559158}.       Wally Borrero RN  11/5/2024 at 12:22 AM  "

## 2024-11-05 NOTE — ED PROVIDER NOTES
"    [place] EMERGENCY DEPARTMENT ([facility])    11/05/24       ED PROVIDER NOTE       History     Chief Complaint   Patient presents with    Ankle Pain     HPI  Nila Agarwal is a 49 year old female who presents to the ED with a right ankle injury.     Patient reports she was standing on her bed trying to reach something Sunday night around 6:30pm, tried to step off down and twisted her ankle. Patient states she felt okay after the incident, went in a hot tub and developed pain and swelling to the lateral right ankle. Patient is able to ambulate. She denies any tingling or numbness. Denies knee pain or other injuries. Patient reports taking tylenol for pain.     Past Medical History  Past Medical History:   Diagnosis Date    Chronic rhinitis 1995    External hemorrhoids with other complication 2011    Fatigue 2012     Past Surgical History:   Procedure Laterality Date    no previous surgeries       hydrocortisone 2.5 % cream  witch hazel-glycerin (TUCKS) pad      Allergies   Allergen Reactions    Seasonal Allergies      Family History  Family History   Problem Relation Age of Onset    Family History Negative No family hx of      Social History   Social History     Tobacco Use    Smoking status: Never    Smokeless tobacco: Never   Substance Use Topics    Alcohol use: No    Drug use: No      Past medical history, past surgical history, medications, allergies, family history, and social history were reviewed with the patient. No additional pertinent items.     A medically appropriate review of systems was performed with pertinent positives and negatives noted in the HPI, and all other systems negative.    Physical Exam   BP: 110/77  Pulse: 70  Temp: 97.9  F (36.6  C)  Resp: 18  Height: 167.6 cm (5' 6\")  Weight: 83.9 kg (185 lb)  SpO2: 99 %  Physical Exam  General: Afebrile, no acute distress   HEENT: Normocephalic, atraumatic, conjunctivae normal. MMM  Neck: non-tender, supple  Cardio: regular rate. " regular rhythm   Resp: Normal work of breathing, no respiratory distress, lungs clear bilaterally, no wheezing, rhonchi, rales  Chest/Back: no visual signs of trauma, no CVA tenderness   Abdomen: soft, non distension, no tenderness, no peritoneal signs   Neuro: alert and fully oriented. CN II-XII grossly intact. Grossly normal strength and sensation in all extremities.   MSK: right ankle with +TTP and swelling to malleolus with full range of motion, flexion, extension, distally neurovascular tact with dorsalis pedis and posterior tibial pulse present bilaterally no deformities. Normal range of motion  Integumentary/Skin: no rash visualized, normal color  Psych: normal affect, normal behavior     ED Course, Procedures, & Data      Procedures       Results for orders placed or performed during the hospital encounter of 11/05/24   Ankle XR, G/E 3 views, right     Status: None    Narrative    EXAM: XR ANKLE RIGHT G/E 3 VIEWS  LOCATION: [facility]  DATE: 11/5/2024    INDICATION: Trauma, pain, swelling  COMPARISON: None.      Impression    IMPRESSION: Normal joint spaces and alignment. No fracture.     Medications - No data to display  Labs Ordered and Resulted from Time of ED Arrival to Time of ED Departure - No data to display  Ankle XR, G/E 3 views, right   Final Result   IMPRESSION: Normal joint spaces and alignment. No fracture.             Critical care was not performed.     Medical Decision Making  The patient's presentation was of low complexity (an acute and uncomplicated illness or injury).    The patient's evaluation involved:  ordering and/or review of 1 test(s) in this encounter (see separate area of note for details)  independent interpretation of testing performed by another health professional (xray)    The patient's management necessitated only low risk treatment.    Assessment & Plan    49-year-old female here with right ankle injury.  Upon arrival patient  is nontoxic-appearing, afebrile, mild distress secondary to pain.  Patient with diffuse tenderness to palpation and swelling noted to her lateral malleolus, distally neurovascular intact with dorsalis pedis and posterior tibial pulse present bilaterally.  Patient is able to bear weight.  Patient took Tylenol prior to arrival, ice applied in the emergency department.  I personally reviewed and interpreted x-ray of the right ankle which x-rays no acute fracture or dislocation.  Plan for supportive care, weightbearing as tolerated, close outpatient follow-up with primary care provider.  Patient understands and agrees with plan.    I have reviewed the nursing notes. I have reviewed the findings, diagnosis, plan and need for follow up with the patient.    New Prescriptions    No medications on file       Final diagnoses:   Pain in joint, ankle and foot, right   I, Anna Karimi, am serving as a trained medical scribe to document services personally performed by Carol Roach MD based on the provider's statements to me on November 5, 2024.  This document has been checked and approved by the attending provider.    I, Carol Roach MD, was physically present and have reviewed and verified the accuracy of this note documented by Anna Karimi, medical scribe.      Carol Roach MD   [facility] EMERGENCY DEPARTMENT  11/5/2024     Carol Roach MD  11/05/24 [id number]

## 2024-11-05 NOTE — ED TRIAGE NOTES
Pt presents to ED Pt states Sunday night she fell from bed while trying to stand on it to reach something. Pt injured her right ankle, states she was feeling okay after incident, went in the hot tub, and then felt pressure and pain in right ankle with swelling. Pt comes to ED with right ankle ACE wrapped.    Pt states in August she fractured her right ankle/foot with no surgery or cast, just ACE wrap.     Pt able to bear weight, with pain. Ambulating ok.

## 2024-11-05 NOTE — LETTER
November 5, 2024      To Whom It May Concern:      Nila Agarwal was seen in our Emergency Department today, 11/05/24.  I expect her condition to improve over the next few days.  She may return to work/school when improved.    Sincerely,        Carol Roach MD

## 2024-11-05 NOTE — DISCHARGE INSTRUCTIONS
Thank you for your patience today.  Please follow-up with your regular doctor in the next 5-7 days for further evaluation and follow-up care.  Please call to schedule an appointment.  Please continue your own medications.  Please take tylenol and ibuprofen every 6 hours as needed for pain. Please ice and elevated while at rest. Please bear weight as tolerated and wear ace wrap for support. Please return to the ER if you develop any worsening of your current symptoms.  It was a pleasure taking care of you today.  We hope you feel better soon.

## 2024-11-07 NOTE — RESULT ENCOUNTER NOTE
[facility]  History & Physical  Orthopedics    SUBJECTIVE:      COVID-19 attestation:  This patient was treated during the COVID- pandemic.  This was discussed with the patient, they are aware of our current policies and procedures, were given the option of delaying their visit and or switching to a virtual visit, delaying their surgery when applicable, and they elect to proceed.    Chief Complaint: bilateral CTS (R>L)    Referring Provider: No ref. provider found     History of Present Illness:  Patient is a 44 y.o. right hand dominant female who presents today with complaints of bilateral numbness/tingling for about 2 years. She states it has been worsening over the past 6 months. She has been wearing carpal tunnel braces at night with mild improvement. She takes ibuprofen as needed. She currently has EMG ordered but not yet scheduled.      Interval history 2024: The patient returns today for re-evaluation.  She reports that her carpal tunnel symptoms are stable from her last visit.  She had a recent EMG returns for those results and re-evaluation today with no new complaints.    The patient is a/an therapist.    Onset of symptoms/DOI was >2y ago.    Symptoms are aggravated by activity, movement, driving, and at night.    Symptoms are alleviated by rest and immobilization.    Symptoms consist of pain and numbness/tingling.    The patient rates their pain as a 2/10.    Attempted treatment(s) and/or interventions include activity modifications, rest, anti-inflammatory medications and immobilization.     The patient denies any fevers, chills, N/V, D/C and presents for evaluation.       Past Medical History:   Diagnosis Date    ADHD (attention deficit hyperactivity disorder)     Depression     Hyperlipidemia     Hypertension     EHSAN (obstructive sleep apnea)     wears cpap    PTSD (post-traumatic stress disorder)      Past Surgical History:   Procedure Laterality Date      I called patient with results - concerning thyroid nodule that has grown.  I will order FNA ASAP.  She will call to schedule within the week.    Explained my concern, and next steps for follow up.  Answered all questions.    Dr. Hailey Barragan MD/[facility] SECTION  1/1/2015, 11/22/2017    LAPAROSCOPIC SALPINGECTOMY Right 10/23/2024    Procedure: SALPINGECTOMY, LAPAROSCOPIC;  Surgeon: Rima Vann DO;  Location: [facility];  Service: OB/GYN;  Laterality: Right;    LAPAROSCOPIC TOTAL HYSTERECTOMY N/A 10/23/2024    Procedure: HYSTERECTOMY, TOTAL, LAPAROSCOPIC;  Surgeon: Rima Vann DO;  Location: [facility] ([facility];  Service: OB/GYN;  Laterality: N/A;    SINUS SURGERY  2009    deviated septum    TUBAL LIGATION  11/22/2017     Review of patient's allergies indicates:   Allergen Reactions    Benadryl allergy-sinus Palpitations     tachycardia    Ciprofloxacin Hives, Shortness Of Breath and Other (See Comments)     hives    Diphenhydramine Shortness Of Breath, Palpitations and Other (See Comments)     Other Reaction(s): Other        Fish containing products Other (See Comments), Anaphylaxis, Hives and Shortness Of Breath     White fish    Any type of fish.    Latex, natural rubber Hives    Nickel Hives and Rash     Social History     Social History Narrative    Not on file     Family History   Problem Relation Name Age of Onset    Arthritis Mother Suki R     Depression Mother Suki R     Diabetes Mother Suki R     Hyperlipidemia Mother Suki R     Hypertension Mother Suki R     Mental illness Mother Suki R     Vision loss Mother Suki R     Depression Father Lanaux R     Mental illness Father Lanaux R     Vision loss Father Lanaux R     Depression Sister Sejal R     Learning disabilities Sister Sejal R     Mental illness Sister Sejal R     Breast cancer Paternal Aunt Jade R     Cancer Paternal Aunt Jade R         breast    Breast cancer Paternal Aunt      Asthma Maternal Grandmother Lesli N     Depression Maternal Grandmother Lesli N     Arthritis Paternal Grandmother Sejal R     Cancer Paternal Grandmother Sejal R         breast    Depression Paternal Grandmother Sejal R     Miscarriages / Stillbirths Paternal Grandmother  Sejal TURNER     Vision loss Paternal Grandmother Sejal TURNER     Breast cancer Paternal Grandfather Mariano TURNER     Heart disease Paternal Grandfather Mariano R     Depression Brother Harpreet TURNER     Learning disabilities Brother Harpreet TURNER     Mental illness Brother Harpreet TURNER          Current Outpatient Medications:     albuterol (PROAIR HFA) 90 mcg/actuation inhaler, , Disp: , Rfl:     azelastine (ASTELIN) 137 mcg (0.1 %) nasal spray, 2 sprays (274 mcg total) by Nasal route 2 (two) times daily., Disp: 30 mL, Rfl: 11    busPIRone (BUSPAR) 5 MG Tab, Take 1 tablet (5 mg total) by mouth once daily., Disp: 90 tablet, Rfl: 0    carvediloL (COREG) 6.25 MG tablet, Take 1 tablet (6.25 mg total) by mouth 2 (two) times daily with meals., Disp: 180 tablet, Rfl: 3    clonazePAM (KLONOPIN) 1 MG tablet, Take 1 tablet (1 mg total) by mouth every evening., Disp: 30 tablet, Rfl: 3    EPINEPHrine (EPIPEN 2-TRINITY) 0.3 mg/0.3 mL AtIn, Inject 0.3 mLs (0.3 mg total) into the muscle once. for 1 dose, Disp: 0.3 mL, Rfl: 6    esomeprazole (NEXIUM) 40 MG capsule, Take 40 mg by mouth before breakfast., Disp: , Rfl:     famotidine (PEPCID) 20 MG tablet, Take 1 tablet (20 mg total) by mouth every evening., Disp: 30 tablet, Rfl: 11    hyoscyamine (LEVSIN) 0.125 mg Subl, , Disp: , Rfl:     ibuprofen (ADVIL,MOTRIN) 800 MG tablet, Take 1 tablet (800 mg total) by mouth 2 (two) times daily with meals., Disp: 30 tablet, Rfl: 2    montelukast (SINGULAIR) 10 mg tablet, Take 10 mg by mouth every evening., Disp: , Rfl:     omega-3s/dha/epa/algal oil (MEGARED ADVANCED OMEGA-3 ALGAE ORAL), Algae based omega 3.  IWi one po daily., Disp: , Rfl:     ondansetron (ZOFRAN) 8 MG tablet, TK 1 T PO BID PRN, Disp: , Rfl:     oxyCODONE-acetaminophen (PERCOCET) 5-325 mg per tablet, Take 1 tablet by mouth every 4 (four) hours as needed for Pain. (Patient not taking: Reported on 11/6/2024), Disp: 20 tablet, Rfl: 0    telmisartan (MICARDIS) 80 MG Tab, Take 1 tablet (80 mg  total) by mouth once daily., Disp: 90 tablet, Rfl: 3    vilazodone (VIIBRYD) 20 mg Tab, Take 1 tablet (20 mg total) by mouth once daily., Disp: 30 tablet, Rfl: 2      Review of Systems:  As per HPI otherwise noncontributory    OBJECTIVE:      Vital Signs (Most Recent):  There were no vitals filed for this visit.    There is no height or weight on file to calculate BMI.      Physical Exam:  Constitutional: The patient appears well-developed and well-nourished. No distress.   Skin: No lesions appreciated  Head: Normocephalic and atraumatic.   Nose: Nose normal.   Ears: No deformities seen  Eyes: Conjunctivae and EOM are normal.   Neck: No tracheal deviation present.   Cardiovascular: Normal rate and intact distal pulses.    Pulmonary/Chest: Effort normal. No respiratory distress.   Abdominal: There is no guarding.   Neurological: The patient is alert.   Psychiatric: The patient has a normal mood and affect.     Bilateral Hand/Wrist Examination:    Observation/Inspection:  Swelling  none    Deformity  none  Discoloration  none     Scars   none    Atrophy  none    HAND/WRIST EXAMINATION:  Finkelstein's Test   Neg  WHAT Test    Neg  Snuff box tenderness   Neg  Preciado's Test    Neg  Hook of Hamate Tenderness  Neg  CMC grind    Neg  Circumduction test   Neg    Neurovascular Exam:  Digits WWP, brisk CR < 3s throughout  NVI motor/LTS to M/R/U nerves, radial pulse 2+  Tinel's Test - Carpal Tunnel  Positive  Tinel's Test - Cubital Tunnel  Neg  Phalen's Test    Neg  Median Nerve Compression Test Positive    ROM hand full, painless    ROM wrist full, painless    ROM elbow full, painless    Abdomen not guarded  Respirations nonlabored  Perfusion intact    Diagnostic Results:     Imaging - I independently viewed the patient's imaging as well as the radiology report.  Xrays of the patient's bilateral hands demonstrate no evidence of any acute fractures or dislocations or significant degenerative changes.    EMG - IMPRESSIONS:   There is electrophysiologic evidence of a bilateral sensory median mononeuropathy across the wrist (I.e. Carpal tunnel syndrome).  There is no motor axonal loss.  There is no active denervation.  This is graded as Mild in severity bilaterally, slightly worse on the right relatively.     ASSESSMENT/PLAN:      44 y.o. yo female with bilateral CTS (R>L)  Plan: The patient and I had a thorough discussion today.  We discussed the working diagnosis as well as several other potential alternative diagnoses.  Treatment options were discussed, both conservative and surgical.  Conservative treatment options would include things such as activity modifications, workplace modifications, a period of rest, oral vs topical OTC and prescription anti-inflammatory medications, occupational therapy, splinting/bracing, immobilization, corticosteroid injections, and others.  Surgical options were discussed as well.     At this time, the patient would like to proceed with   A right-sided endoscopic versus open carpal tunnel release on November 13, 2024 which I feel is reasonable.  I will get this set up.        The patient has not responded to adequate non operative treatment at this time and/or non operative treatment is not indicated. Thus, the risks, benefits and alternatives to surgery were discussed with the patient in detail.  Specific risks include but are not limited to bleeding, infection, vessel and/or nerve damage, pain, numbness, tingling, complex regional pain syndrome, compartment syndrome, failure to return to pre-injury and/or preoperative functional status, scar sensitivity, delayed healing, inability to return to work, pulley injury, tendon injury, bowstringing, partial and/or incomplete relief of symptoms, weakness, persistence of and/or worsening of symptoms, surgical failure, osteomyelitis, amputation, loss of function, stiffness, functional debility, dysfunction, decreased  strength, need for prolonged  postoperative rehabilitation, need for further surgery, deep venous thrombosis, pulmonary embolism, arthritis and death.  The patient states an understanding and wishes to proceed with surgery.   All questions were answered.  No guarantees were implied or stated.  Written informed consent was obtained.        Jamey Mccord M.D.    Please be aware that this note has been generated with the assistance of [employer] voice-to-text.  Please excuse any spelling or grammatical errors.    Thank you for choosing Dr. Jamey Mccord for your orthopedic hand and upper extremity care. It is our goal to provide you with exceptional care that will help keep you healthy, active, and get you back in the game.     If you felt that you received exemplary care today, please consider leaving feedback for Dr. Mccord on [employer] at [email or web address].    Please do not hesitate to reach out to us via email, phone, or MyChart with any questions, concerns, or feedback.

## 2024-11-08 ENCOUNTER — OFFICE VISIT (OUTPATIENT)
Dept: URGENT CARE | Facility: URGENT CARE | Age: 49
End: 2024-11-08
Payer: COMMERCIAL

## 2024-11-08 VITALS
BODY MASS INDEX: 30.34 KG/M2 | DIASTOLIC BLOOD PRESSURE: 72 MMHG | OXYGEN SATURATION: 100 % | SYSTOLIC BLOOD PRESSURE: 102 MMHG | WEIGHT: 188 LBS | TEMPERATURE: 97.5 F | HEART RATE: 78 BPM | RESPIRATION RATE: 16 BRPM

## 2024-11-08 DIAGNOSIS — S93.491A SPRAIN OF OTHER LIGAMENT OF RIGHT ANKLE, INITIAL ENCOUNTER: Primary | ICD-10-CM

## 2024-11-08 PROCEDURE — 99213 OFFICE O/P EST LOW 20 MIN: CPT | Performed by: INTERNAL MEDICINE

## 2024-11-08 ASSESSMENT — PAIN SCALES - GENERAL: PAINLEVEL_OUTOF10: SEVERE PAIN (7)

## 2024-11-08 NOTE — PROGRESS NOTES
ASSESSMENT AND PLAN:      ICD-10-CM    1. Sprain of other ligament of right ankle, initial encounter  S93.491A Ankle/Foot Bracing Supplies Order Ankle Brace (tie up ankle splint); Right      Informed patient that sprain needs support of brace for healing as symptoms worsening since initial injury.    Recommended avoiding sandal  Recommended type ankle splint with supportive tennis shoes    Handout given on ankle sprain and also rehab exercises      Patient Instructions       Tie up ankle splint with tennis shoes    advised to apply ice or cold packs intermittently as needed to relieve pain.  ibuprofen 200 mg 3 x day with food   Or aleve 2 x day with food    Xray - no fracture     May take 1 month to heal        Radha Hernandez MD  [facility] URGENT [facility]ARE    Subjective     Nila Agarwal is a 49 year old who presents for Patient presents with:  Urgent Care  Foot Injury: Hurt right foot/ankle swelling and pain . Hurt it 3 months ago and 3 days ago it swelled up    an established patient of [facility].    MS Injury/Pain  Concern for right foot ankle pain and swelling   Initial injury 4 months ago in [place] and also 4 days ago at home  Seen in ER, x-ray was taken and told there was no broken bones.  No treatment given  Today swelling is worse.  Her sister told her she is not wearing supportive shoes.  She is wearing thong like sandals  Current and Associated symptoms: Pain and Swelling    Aggravating Factors: weight-bearing  Therapies to improve symptoms include: rest      Review of Systems        Objective    /72   Pulse 78   Temp 97.5  F (36.4  C) (Temporal)   Resp 16   Wt 85.3 kg (188 lb)   LMP 10/23/2024 (Exact Date)   SpO2 100%   BMI 30.34 kg/m    Physical Exam  Vitals reviewed.   Constitutional:       Appearance: Normal appearance.   Musculoskeletal:      Comments: Exam of the injured ankle reveals swelling and tenderness over the lateral malleolus. No tenderness over the medial aspect  of the ankle. The fifth metatarsal is not tender.  The rest of the foot, ankle and leg exam is normal.     Neurological:      Mental Status: She is alert.        I reviewed x-ray with patient.  No fracture noted.

## 2024-11-09 NOTE — PATIENT INSTRUCTIONS
Tie up ankle splint with tennis shoes    advised to apply ice or cold packs intermittently as needed to relieve pain.  ibuprofen 200 mg 3 x day with food   Or aleve 2 x day with food    Xray - no fracture     May take 1 month to heal

## 2024-11-12 ENCOUNTER — OFFICE VISIT (OUTPATIENT)
Dept: FAMILY MEDICINE | Facility: CLINIC | Age: 49
End: 2024-11-12
Payer: COMMERCIAL

## 2024-11-12 VITALS
HEIGHT: 66 IN | SYSTOLIC BLOOD PRESSURE: 111 MMHG | TEMPERATURE: 98.2 F | OXYGEN SATURATION: 100 % | RESPIRATION RATE: 16 BRPM | DIASTOLIC BLOOD PRESSURE: 81 MMHG | BODY MASS INDEX: 30.37 KG/M2 | WEIGHT: 189 LBS | HEART RATE: 82 BPM

## 2024-11-12 DIAGNOSIS — S93.491D SPRAIN OF ANTERIOR TALOFIBULAR LIGAMENT OF RIGHT ANKLE, SUBSEQUENT ENCOUNTER: Primary | ICD-10-CM

## 2024-11-12 DIAGNOSIS — K21.9 GASTROESOPHAGEAL REFLUX DISEASE, UNSPECIFIED WHETHER ESOPHAGITIS PRESENT: ICD-10-CM

## 2024-11-12 RX ORDER — LIDOCAINE 4 G/G
1 PATCH TOPICAL EVERY 24 HOURS
Qty: 15 PATCH | Refills: 0 | Status: SHIPPED | OUTPATIENT
Start: 2024-11-12

## 2024-11-12 RX ORDER — CALCIUM CARBONATE 500 MG/1
1 TABLET, CHEWABLE ORAL 3 TIMES DAILY PRN
Qty: 30 TABLET | Refills: 0 | Status: SHIPPED | OUTPATIENT
Start: 2024-11-12

## 2024-11-12 RX ORDER — ACETAMINOPHEN 500 MG
500-1000 TABLET ORAL EVERY 8 HOURS PRN
Qty: 30 TABLET | Refills: 0 | Status: SHIPPED | OUTPATIENT
Start: 2024-11-12

## 2024-11-12 RX ORDER — NAPROXEN 500 MG/1
500 TABLET ORAL 2 TIMES DAILY WITH MEALS
Qty: 28 TABLET | Refills: 0 | Status: SHIPPED | OUTPATIENT
Start: 2024-11-12 | End: 2024-11-26

## 2024-11-12 NOTE — PROGRESS NOTES
"  Assessment & Plan     Sprain of anterior talofibular ligament of right ankle, subsequent encounter  Patient injured right ankle 10 days ago after a misstep off of her bed. Since the injury the swelling and pain have worsened. She was evaluated in the ED and UC where xrays were negative for a fracture and she was given an ankle brace and started on a 3 day course of ibuprofen. Exam today showed swelling and tenderness of the lateral malleolus with negative squeeze test, normal pedal pulses, and tenderness to palpation consistent with an ATFL sprain. Recommend patient take naproxen for 2 weeks with tylenol as needed, use lidocaine patches daily, and attend PT as this is the third time she has sprained this ankle. Also gave work note with instructions to allow for light duty for the next two weeks.   - naproxen (NAPROSYN) 500 MG tablet; Take 1 tablet (500 mg) by mouth 2 times daily (with meals) for 14 days.  - Lidocaine (LIDOCARE) 4 % Patch; Place 1 patch over 12 hours onto the skin every 24 hours. To prevent lidocaine toxicity, patient should be patch free for 12 hrs daily.  - Physical Therapy  Referral; Future  - acetaminophen (TYLENOL) 500 MG tablet; Take 1-2 tablets (500-1,000 mg) by mouth every 8 hours as needed for mild pain.    Gastroesophageal reflux disease, unspecified whether esophagitis present  Briefly discussed with patient symptoms that are likely related to GERD. Based on brief discussion, recommended patient try taking Tums as she said she has a history of heartburn.   - calcium carbonate (TUMS) 500 MG chewable tablet; Take 1 tablet (500 mg) by mouth 3 times daily as needed for heartburn.      BMI  Estimated body mass index is 30.51 kg/m  as calculated from the following:    Height as of this encounter: 1.676 m (5' 6\").    Weight as of this encounter: 85.7 kg (189 lb).     Return if symptoms worsen or fail to improve.    Sony Dotson is a 49 year old, presenting for the following " "health issues:  Musculoskeletal Problem (Fall past weekend. Moving and fell into wood. Sprain. ) and Medication Request (Tylenol and nasal spray )        10/8/2024     4:05 PM   Additional Questions   Roomed by debo   Accompanied by self     HPI     Mechanism of injury:  - Was putting unit(s)p some pictures, and fell on her foot axially, perhaps tripped   - pressure pain   - dorsiflexion has been painful   - 3rd time she injured the same ankle   - slipped on water, fell on the stair  - swelling has been stable, sometimes getting better   - steam room has been helpful    - heard a cracking noise   - no numbness or tingling currently     - Brace has been helpful,has been getting more painful.     Also discussed some symptoms of potential GERD/heartburn.  - Worse after eating fatty foods or chicken      Objective    /81   Pulse 82   Temp 98.2  F (36.8  C) (Oral)   Resp 16   Ht 1.676 m (5' 6\")   Wt 85.7 kg (189 lb)   LMP 10/23/2024 (Exact Date)   SpO2 100%   BMI 30.51 kg/m    Body mass index is 30.51 kg/m .  Physical Exam  Constitutional:       Appearance: Normal appearance.   Pulmonary:      Effort: Pulmonary effort is normal.   Musculoskeletal:      Right lower leg: Swelling and tenderness present. Edema present.      Left lower leg: No swelling or tenderness. No edema.      Right ankle: Swelling present. Tenderness present over the lateral malleolus and ATF ligament. Decreased range of motion. Anterior drawer test negative.      Left ankle: Normal.      Right foot: No tenderness.   Neurological:      Mental Status: She is alert.            Josephine Blanco, MS3  [employer]  [employer]  November 12, 2024 1:55 PM     Signed Electronically by: Negin Guerra DO    "

## 2024-11-12 NOTE — LETTER
2024    Nila Agarwal   1975        To Whom it May Concern;    Please excuse Nila Agarwal from work/school for a healthcare visit on 2024. Please allow light duty for the next 2 weeks.     Sincerely,        Negin Guerra DO

## 2024-11-12 NOTE — PROGRESS NOTES
Preceptor Attestation:   Patient seen, evaluated and discussed with the resident. I have verified the content of the note, which accurately reflects my assessment of the patient and the plan of care.   Supervising Physician:  Deborah Monge MD

## 2024-11-18 ENCOUNTER — OFFICE VISIT (OUTPATIENT)
Dept: FAMILY MEDICINE | Facility: CLINIC | Age: 49
End: 2024-11-18
Payer: COMMERCIAL

## 2024-11-18 VITALS
RESPIRATION RATE: 20 BRPM | HEIGHT: 66 IN | BODY MASS INDEX: 30.74 KG/M2 | TEMPERATURE: 97.9 F | HEART RATE: 74 BPM | OXYGEN SATURATION: 100 % | DIASTOLIC BLOOD PRESSURE: 70 MMHG | SYSTOLIC BLOOD PRESSURE: 117 MMHG | WEIGHT: 191.3 LBS

## 2024-11-18 DIAGNOSIS — Z12.11 SCREEN FOR COLON CANCER: ICD-10-CM

## 2024-11-18 DIAGNOSIS — S93.491D SPRAIN OF ANTERIOR TALOFIBULAR LIGAMENT OF RIGHT ANKLE, SUBSEQUENT ENCOUNTER: Primary | ICD-10-CM

## 2024-11-18 DIAGNOSIS — K64.9 HEMORRHOIDS, UNSPECIFIED HEMORRHOID TYPE: ICD-10-CM

## 2024-11-18 NOTE — Clinical Note
I believe she may have resprained her ATLF.  Letting you know in case you would like to see her again

## 2024-11-18 NOTE — PROGRESS NOTES
"Assessment & Plan     Nila is a 49 year old patient who presents for the following concerns:    Sprain of anterior talofibular ligament of right ankle, subsequent encounter  Based on exam, I believe she has resprained her ATLF on 11/16.  Reviewed x-rays and note from outside ED, no fracture.  No indication to reimage today. Has physical therapy today.  Will prescribe crutches per patient request, aware of instruction to only use for limited time.   - Crutches Order  -Follow-up with regular care team    Hemorrhoids, unspecified hemorrhoid type  Screen for colon cancer  Provided phone number to schedule for colonoscopy due to rectal bleeding, see previous note for details    Orders Placed This Encounter   Procedures    Crutches Order       Post Medication Reconciliation Status: medications reconciled and changed, per note/orders    Return if symptoms worsen or fail to improve.    Sujatha Arcos MD  [facility]    Subjective         11/18/2024     1:08 PM   Additional Questions   Roomed by Ohn   Accompanied by Daughter         11/18/2024    Information    services provided? No          This is a 49 year old patient, presenting for the following health concerns:    Rectal Problem (bleeding) and Musculoskeletal Problem (Pt twist leg again)    Seen for rectal bleeding 99/11/2024. Longstanding history of intermittent bright red bleeding per rectum.  Patient had anoscopy done in March 2024, suggestive of internal hemorrhoids. Has bee prescribed MiraLAX    Had a ankle sprain.   Saturday, said to make an appointment with doctor, was told she re-sprainted it    Review of Systems   Constitutional, HEENT, cardiovascular, pulmonary, gi and gu systems are negative, except as otherwise noted.      Objective    /70   Pulse 74   Temp 97.9  F (36.6  C) (Oral)   Resp 20   Ht 1.676 m (5' 6\")   Wt 86.8 kg (191 lb 4.8 oz)   LMP 11/17/2024 (Exact Date)   SpO2 100%   BMI 30.88 kg/m  "   Wt Readings from Last 4 Encounters:   11/18/24 86.8 kg (191 lb 4.8 oz)   11/12/24 85.7 kg (189 lb)   11/08/24 85.3 kg (188 lb)   11/05/24 83.9 kg (185 lb)       Physical Exam    General: Alert and oriented, in no acute distress.  CV: No cyanosis or pallor, warm and well perfused.  Normal pedal pulses  Respiratory: No respiratory distress, no accessory muscle use.  Psychiatric: Mood and affect appear normal.   Neuro: Lower extremity sensation normal  Msk: Swelling and tendernes posterior to the lateral malleolus.  Normal ankle range of motion, strength normal but somewhat limited by pain.  Can partially weight bear but only for short distances.  No midfoot tenderness      Sujatha Arcos MD on 11/18/2024 at 1:19 PM    ----- Service Performed and Documented by Resident or Fellow ------            .DME (Durable Medical Equipment) Orders and Documentation  Orders Placed This Encounter   Procedures    Crutches Order        The patient was assessed and it was determined the patient is in need of the following listed DME Supplies/Equipment. Please complete supporting documentation below to demonstrate medical necessity.      Unable to bear weight fully on right leg due to acute injury.

## 2024-11-26 ENCOUNTER — OFFICE VISIT (OUTPATIENT)
Dept: FAMILY MEDICINE | Facility: CLINIC | Age: 49
End: 2024-11-26
Payer: COMMERCIAL

## 2024-11-26 VITALS
SYSTOLIC BLOOD PRESSURE: 138 MMHG | WEIGHT: 191.1 LBS | TEMPERATURE: 98.2 F | OXYGEN SATURATION: 100 % | RESPIRATION RATE: 18 BRPM | DIASTOLIC BLOOD PRESSURE: 78 MMHG | BODY MASS INDEX: 30.71 KG/M2 | HEART RATE: 80 BPM | HEIGHT: 66 IN

## 2024-11-26 DIAGNOSIS — K62.89 RECTAL DISCOMFORT: ICD-10-CM

## 2024-11-26 DIAGNOSIS — K64.8 INTERNAL HEMORRHOID: Primary | ICD-10-CM

## 2024-11-26 RX ORDER — HYDROCORTISONE 25 MG/G
CREAM TOPICAL 2 TIMES DAILY
Qty: 30 G | Refills: 0 | Status: SHIPPED | OUTPATIENT
Start: 2024-11-26

## 2024-11-26 NOTE — PROGRESS NOTES
Assessment & Plan     Internal hemorrhoid  Rectal discomfort  Chronic Hx of constipation and hemorrhoids, with intermittent rectal bleeding lasting 1 week at a time. Evaluated by colorectal surgery in 3/2024, was recommended conservative management. On exam, no anal fissure appreciable. There were non-irritated external skin tags and a tender L lateral internal hemorrhoid. Suspect source of bleeding is from hemorrhoid rather than lower GI bleed. Hemoglobin levels have been consistently stable. Per patient, had a colonoscopy 17 years ago, however, do not see those records. Will order colonoscopy to fulfill colorectal cancer screening  and for evaluation of rectal bleeding. Discussed at-home care for constipation and hemorrhoids. Refilled hydrocortisone cream with witch hazel pads. Encouraged increased hydration and regular use of MiraLax (fine-tuning to find the right dose for her).   - hydrocortisone 2.5 % cream; Apply topically 2 times daily.  - witch hazel-glycerin (TUCKS) pad; Apply topically as needed for hemorrhoids.  - Colonoscopy Screening  Referral; Future    Return if symptoms worsen or fail to improve.    Sony Dotson is a 49 year old, presenting for the following health issues:  Rectal Problem (Pt presents with hemorrhoids, she is c/o persistent bleeding denies pain )        11/26/2024     3:19 PM   Additional Questions   Roomed by Joseluisk   Accompanied by daughter         11/26/2024    Information    services provided? No        HPI   Rectal discomfort/bleeding  Chronic HX of hemorrhoids and constipation. Stool quality improved, more soft, eating more cabbage and dates. Takes Miralax as needed. Doesn't feel like she's been straining as much with this bowel regimen. Usually 1 BM in the am. The past week, noticed bright red blood with wiping. Blood is both in stool and on toilet paper. Stool is not watery. Has mild rectal discomfort, worse with extended sitting. No  "itching. Would like refill of hydrocortisone cream and witch hazel pads. Saw colorectal surgery in March, was not recommended hemorrhoid surgery, only conservative management. Feels her hemorrhoids worsened since giving birth to her daughter. No dysuria or hematuria. Had a colonoscopy 17 years ago for similar symptoms, rectal bleeding. Was told it was normal. She is open to another colonoscopy.     Review of Systems  Constitutional, HEENT, cardiovascular, pulmonary, gi and gu systems are negative, except as otherwise noted.      Objective    /78   Pulse 80   Temp 98.2  F (36.8  C) (Oral)   Resp 18   Ht 1.676 m (5' 6\")   Wt 86.7 kg (191 lb 1.6 oz)   LMP 11/17/2024 (Exact Date)   SpO2 100%   BMI 30.84 kg/m    Body mass index is 30.84 kg/m .    Physical Exam  Vitals reviewed.   Constitutional:       Appearance: Normal appearance.   HENT:      Head: Normocephalic and atraumatic.   Eyes:      Extraocular Movements: Extraocular movements intact.      Conjunctiva/sclera: Conjunctivae normal.      Pupils: Pupils are equal, round, and reactive to light.   Genitourinary:     Rectum: Internal hemorrhoid present. No anal fissure.      Comments: L lateral internal hemorrhoid palpable, tender to palpation per pt. No anal fissure. External skin tags present, non erythematous.   Neurological:      General: No focal deficit present.      Mental Status: She is alert and oriented to person, place, and time.   Psychiatric:         Mood and Affect: Mood normal.         Behavior: Behavior normal.              Signed Electronically by: Ericka Jenkins DO    "

## 2024-11-26 NOTE — LETTER
To Whom It May Concern,     Please allow for light activity for the next 2 weeks.                 Sincerely,        Ericka Jenkins, DO

## 2024-11-26 NOTE — PATIENT INSTRUCTIONS
Patient Education   Here is the plan from today's visit    - Refill hydrocoritsone cream and witch hazel pads  - Please use MiraLax as needed  - Please drink more water  - Colonoscopy referral     Please call or return to clinic if your symptoms don't go away.    Follow up plan:  Return if symptoms worsen or fail to improve.    Thank you for coming to [facility] today.  Lab Testing:  **If you had lab testing today and your results are reassuring or normal they will be mailed to you or sent through [employer] within 7 days.   **If the lab tests need quick action we will call you with the results.  **If you are having labs done on a different day, please call [phone] to schedule at [facility] or [phone] for other [facility] locations. Labs do not offer walk-in appointments.  The phone number we will call with results is # [phone] (home) . If this is not the best number please call our clinic and change the number.  Medication Refills:  If you need any refills please call your pharmacy and they will contact us.   If you need to  your refill at a new pharmacy, please contact the new pharmacy directly. The new pharmacy will help you get your medications transferred faster.   Scheduling:  If you have any concerns about today's visit or wish to schedule another appointment please call our office during normal business hours [phone] (8-5:00 M-F). If you can no longer make a scheduled visit, please cancel via [employer] or call us to cancel.   If a referral was made to an [facility] specialty provider and you do not get a call from central scheduling, please refer to directions on your visit summary or call our office during normal business hours for assistance.   If a Mammogram was ordered for you at the [facility] call [phone] to schedule or change your appointment.  If you had an XRay/CT/Ultrasound/MRI ordered the number is [phone] to schedule or change  your radiology appointment.   [facility] has limited ultrasound appointments available on Wednesdays, if you would like your ultrasound at [facility], please call [phone] to schedule.   Medical Concerns:  If you have urgent medical concerns please call [phone] at any time of the day.    Ericka Jenkins, DO

## 2024-12-05 ENCOUNTER — TELEPHONE (OUTPATIENT)
Dept: GASTROENTEROLOGY | Facility: CLINIC | Age: 49
End: 2024-12-05
Payer: COMMERCIAL

## 2024-12-05 DIAGNOSIS — K62.5 RECTAL BLEEDING: Primary | ICD-10-CM

## 2024-12-05 RX ORDER — BISACODYL 5 MG/1
TABLET, DELAYED RELEASE ORAL
Qty: 4 TABLET | Refills: 0 | Status: SHIPPED | OUTPATIENT
Start: 2024-12-05

## 2024-12-05 NOTE — TELEPHONE ENCOUNTER
"Endoscopy Scheduling Screen    Have you had any respiratory illness or flu-like symptoms in the last 10 days?  No    What is your communication preference for Instructions and/or Bowel Prep?   MyChart-patient is going to set up    What insurance is in the chart?  Other:  [facility] [employer]    Ordering/Referring Provider: Gregory   (If ordering provider performs procedure, schedule with ordering provider unless otherwise instructed. )    BMI: Estimated body mass index is 30.84 kg/m  as calculated from the following:    Height as of 11/26/24: 1.676 m (5' 6\").    Weight as of 11/26/24: 86.7 kg (191 lb 1.6 oz).     Sedation Ordered  moderate sedation.   If patient BMI > 50 do not schedule in [facility].    If patient BMI > 45 do not schedule at [facility].    Are you taking methadone or Suboxone?  NO, No RN review required.    Have you been diagnosed and are being treated for severe PTSD or severe anxiety?  NO, No RN review required.    Are you taking any prescription medications for pain 3 or more times per week?   NO, No RN review required.    Do you have a history of malignant hyperthermia?  No    (Females) Are you currently pregnant?        Have you been diagnosed or told you have pulmonary hypertension?   No    Do you have an LVAD?  No    Have you been told you have moderate to severe sleep apnea?  No.    Have you been told you have COPD, asthma, or any other lung disease?  No    Do you have any heart conditions?  No     Have you ever had or are you waiting for an organ transplant?  No. Continue scheduling, no site restrictions.    Have you had a stroke or transient ischemic attack (TIA aka \"mini stroke\" in the last 6 months?   No    Have you been diagnosed with or been told you have cirrhosis of the liver?   No.    Are you currently on dialysis?   No    Do you need assistance transferring?   No    BMI: Estimated body mass index is 30.84 kg/m  as calculated from the following:    Height as of 11/26/24: 1.676 m (5' 6\").    Weight as of " 11/26/24: 86.7 kg (191 lb 1.6 oz).     Is patients BMI > 40 and scheduling location [facility]?  No    Do you take an injectable or oral medication for weight loss or diabetes (excluding insulin)?  No    Do you take the medication Naltrexone?  No    Do you take blood thinners?  No       Prep   Are you currently on dialysis or do you have chronic kidney disease?  No    Do you have a diagnosis of diabetes?  No    Do you have a diagnosis of cystic fibrosis (CF)?  No    On a regular basis do you go 3 -5 days between bowel movements?  No    BMI > 40?  No    Preferred Pharmacy:    [id number] [address] AT [facility] & [address] [place] [id number]  Phone: [phone] Fax: [phone]      Final Scheduling Details     Procedure scheduled  Colonoscopy    Surgeon:  Alise     Date of procedure:  12/13/24     Pre-OP / PAC:   No - Not required for this site.    Location  [facility] - [facility] - Patient preference.    Sedation   Moderate Sedation - Per order.      Patient Reminders:   You will receive a call from a Nurse to review instructions and health history.  This assessment must be completed prior to your procedure.  Failure to complete the Nurse assessment may result in the procedure being cancelled.      On the day of your procedure, please designate an adult(s) who can drive you home stay with you for the next 24 hours. The medicines used in the exam will make you sleepy. You will not be able to drive.      You cannot take public transportation, ride share services, or non-medical taxi service without a responsible caregiver.  Medical transport services are allowed with the requirement that a responsible caregiver will receive you at your destination.  We require that drivers and caregivers are confirmed prior to your procedure.

## 2024-12-05 NOTE — LETTER
December 5, 2024      Nila Gaytan Stefano  [address]   [facility] [address]              Colonoscopy     Procedure date: 12/13/24    Anticipated arrival time: 7:15 AM   (Please note that arrival times may change)    Facility location: [facility]; [address] [facility], 5th Floor, [place] [address] - Check in location: 5th Floor. Parking information: Self pay parking is available in West Lot located directly across from the main entrance of the [facility]. Entry and exit to this lot is on [facility]. In addition, self pay parking is available in [address] which is located west of [facility]. Follow the center josé miguel designated ' [address] [place]' to ground-level spaces that are reserved for patients. Please disregard any signage indicating the ramp is full or available by reservation only as this does not pertain to the spaces dedicated for patients coming to the clinic.  services are available for patients with limited mobility. Services available Monday-Friday, 7:00a.m.-  5:00p.m.    Important Procedure Reminders:     Prep Instructions:   Instructions on how to prepare for your upcoming procedure are found below. Please read instructions carefully. Deviation from instructions may result in less than desired outcomes and procedure may need to be rescheduled.   If you have additional questions regarding how to prepare for your upcoming procedure, contact our endoscopy pre assessment nurses at [phone] option 2 Monday through Friday 7:00am-5:00pm.      Policy:   The medications used during the procedure will make you sleepy, so you won't be able to drive. On the day of your procedure, please have an adult ready to drive you home and stay with you for the next 6 hours.   You can't use public transportation, ride-share services, or non-medical taxi services without a responsible caregiver. Medical transport services are okay, but a caregiver must be there to receive you at  your destination.   Make sure your  and caregiver are confirmed before your procedure.    Day of procedure:  Please keep in mind that arrival times may change. Let your  know there might be a one-hour window for changes.  We ask that you please check in at the  with your . Your  should remain on campus.  Expect to be at the procedure center for about 1.5-2.5 hours.    Please do not wear jewelry (i.e. earrings, rings, necklaces, watches, etc). Leave your purse, billfold, credit cards, and other valuables at home.   Bring insurance card and ID.     To cancel or reschedule your procedure:   If you need to cancel or reschedule, our endoscopy scheduling team can be reached at [phone], option 2. Monday through Friday, 7:00am-5:00pm.    Medication Reminders:    Please note the following medication holding recommendations:   N/A    ---------------------------------------------------------------------------------------------------------------------------------------------------------------------------------------------------------------        Your colonoscopy prep prescription has been sent to    [id number] - [place], [place] - [address] AT [address] Extended Bowel Prep   Prep instructions for your colonoscopy     [facility]; [address] [place] [address], 5th Floor, [place] [address] - Check in location: 5th Floor.  For prep questions, please call: [facility] - [phone] option 2    Please read these instructions carefully at least 7 days prior to your colonoscopy procedure. Be sure to follow all directions completely. The inside of your colon must be clean to allow for a complete examination for the presence of any growths, polyps, and/or abnormalities, as well as their biopsy or removal. A number of tips are included in order to make this part of the procedure as comfortable as  possible.              Getting ready      prescription at the pharmacy. Endoscopy team will order this about 2 weeks before to your scheduled procedure. Included in the kit will be the followin  Dulcolax (Bisacodyl) 5mg tablets  Two containers of Polyethylene glycol (Golytely, Colyte, Nulytely, Gavilyte-G)    A nurse will call you to go over your appointment details and prep instructions. Not completing the nurse call could result with your appointment being cancelled.    You must arrange for an adult to drive you home after your exam. Your colonoscopy cannot be done unless you have a ride. If you need to use public transportation, someone must ride with you and stay with you for up to 24 hours.       7 days before procedure     Medications that may need to be held before procedure:     GLP-1 agonist medication for diabetes or weight loss: such as Mounjaro (Tirzepatide).  Ozempic (Semaglutide). Rybelsus (Semaglutide), Tirzepatide-Weight Management (Zepbound), Wegovy (Semaglutide) or others, holding times may vary based on how you take this medication. This may be up to a 7 day hold. Our pre assessment nurses will call and discuss holding recommendations 1-2 weeks before scheduled procedure.     Blood thinning and/or anti platelet medications: such as Coumadin, Plavix, Xarelto, Eliquis, Lovenox or others, ask your your prescribing provider about holding recommendations.     If you take insulin for diabetes, ask your prescribing provider for instructions on how to manage this medication while preparing for a colonoscopy.     Stop taking iron (ferrous sulfate), multivitamins that contain iron, and/or fiber supplements (Metamucil, Benefiber, Psyllium husk powder, Fibercon, Bran, etc.) 7 days before procedure.     Stop eating whole kernel corn, popcorn, nuts, and foods that contain seeds. These can stay in the colon for many days and they can clog up the colonoscope.     Begin a low-fiber diet. (See  examples below). No Olestra (a fat substitute).   Consume no more than 10-15 grams of fiber each day.                       LOW FIBER DIET   You may have: Do not have:    Starches: White bread, rolls, biscuits, croissants, Vivian toast, white flour tortillas, waffles, pancakes, Nicaraguan toast; white rice, noodles, pasta, macaroni; cooked and peeled potatoes; plain crackers, saltines; cooked farina or cream of rice; puffed rice, corn flakes, Rice Krispies, Special K      Vegetables: tender cooked and canned, vegetable broths     Fruits and fruit juices: Strained fruit juice, canned fruit without seeds or skin (not pineapple), applesauce, pear sauce, ripe bananas, melons (not watermelon)     Milk products: Milk (plain or flavored), cheese, cottage cheese, yogurt (no berries), custard, ice cream       Proteins: Tender, well-cooked ground beef, lamb, veal, ham, pork, chicken, turkey, fish or organ meat, Tofu, eggs, creamy peanut butter      Fats and condiments: Margarine, butter, oils, mayonnaise, sour cream, salad dressing, plain gravy; spices, cooked herbs; sugar, clear jelly, honey, syrup      Snacks, sweets and drinks: Pretzels, hard candy; plain cakes and cookies (no nuts or seeds); gelatin, plain pudding, sherbet, Popsicles; coffee, tea, carbonated ( fizzy ) drinks  Starches: Breads or rolls that contain nuts, seeds or fruit; whole wheat or whole grain breads that contain more than 2 grams of fiber per serving; cornbread; corn or whole wheat tortillas; potatoes with skin; brown rice, wild rice, quinoa, kasha (buckwheat), and oatmeal      Vegetables: Any raw or steamed vegetables; vegetables with seeds; corn in any form      Fruits and fruit juices: Prunes, prune juice, raisins and other dried fruits, berries and other fruits with seeds, canned pineapple juices with pulp such as orange, grapefruit, pineapple or tomato juice     Milk products: Any yogurt with nuts, seeds or berries      Proteins: Tough, fibrous meats  with gristle; cooked dried beans, peas or lentils; crunchy peanut butter     Fats and condiments: Pickles, olives, relish, horseradish; jam, marmalade, preserves      Snacks, sweets and drinks: Popcorn, nuts, seeds, granola, coconut, candies made with nuts or seeds; all desserts that contain nuts, seeds, raisins and other dried fruits, coconut, whole grains or bran.                                       2 days before procedure       You may have a light, low fiber breakfast and lunch. Begin a clear liquid diet AFTER 1 PM. Do not eat solid foods. (See examples below).    Drink at least eight to ten 8-ounce glasses of water throughout the day  ? ? ? ? ? ? ? ?    Fill one container of Golytely with a full gallon of warm water. Cover and shake until well mixed. Chill in refrigerator until it is time to drink solution.     CLEAR LIQUID DIET:  You can have: Do not have:    Water, tea, coffee (no milk or cream)   Soda pop, Gatorade (not red or purple)   Coconut water   Jell-O, Popsicles (no milk or fruit pieces - not red or purple)   Fat-free soup broth or bouillon   Plain hard candy, such as clear life savers (not red or purple)   Clear juices and fruit-flavored drinks, such as apple juice, white grape juice, Hi-C, and Watson-Aid (not red or purple)    Milk or milk products such as ice cream, malts or shakes, or coffee creamer   Red or purple drinks of any kind such as cranberry juice, grape juice, or Watson-Aid. Avoid red or purple Jell-O, Popsicles, sorbet, sherbet and candy.   Juices with pulp such as orange, grapefruit, pineapple, or tomato juice   Cream soups of any kind   Alcohol and beer   Protein drinks or protein powder     Step 1     At 4 PM, take 2 Dulcolax (Bisacodyl) tablets.  At 5 PM, start drinking one 8-ounce glass of Golytely mixture every 15 minutes until the container is HALF empty. Drink each glass quickly. Store the rest in the refrigerator.   Continue to drink clear liquids.      Reminders While Drinking  Laxatives:     After you start drinking the solution, stay near a toilet. You may have watery stools (diarrhea), mild cramping, bloating, and nausea. You may want to use Vaseline on the skin around your anus after each bowel movement or use wet wipes to prevent irritation. Bowel movements will be liquid and dark in color at first and then should turn clear yellow in color. Drinking the prepared solution may make you cold, wearing warm clothing can be helpful.    Some find it helpful to:  For added flavor, include a crystal light lemonade packet in each glass of Golytely, rather than mixing it into the entire prepared mixture.  In between each glass, try sucking on a lemon or a piece of hard candy to help diminish the flavor of the preparation.  Drinking from a straw can help minimize the taste of the prepared mixture.    If you have nausea or vomiting during drinking the solution, rinse your mouth with water and take a 15-30 minute break and then continue drinking solution.       1 day before procedure       Continue on a clear liquid diet. Do not eat solid foods.    Drink at least eight to ten 8-ounce glasses of water throughout the day  ? ? ? ? ? ? ? ?    Step 2     At 4 PM, take 2 Dulcolax (Bisacodyl) tablets.  At 5 PM, start drinking the 2nd half of Golytely mixture. Drink one 8-ounce glass of Golytely mixture every 15 minutes until the container is empty.  Drink each glass quickly.   Continue to drink clear liquids.    Before you go to bed mix the second container of Golytely and place in the refrigerator for the morning.     Day of procedure     Step 3     6 hours before your arrival time, start drinking one 8-ounce glass of Golytely mixture every 15 minutes until the container is HALF empty. You will not drink the entire container. The remaining solution can be discarded.     2 hours before your arrival time stop drinking all liquids, including water.   Do not smoke or swallow anything, including water or gum  for at least 2 hours before your arrival time. This is a safety issue. Your procedure could be cancelled if you do not follow directions.  No chewing tobacco 6 hours prior to procedure arrival time.     You may take your necessary morning medications with sips of water (4 ounces).   Do not take diabetes medicine by mouth until after your exam.  If you have asthma, bring your inhalers.  Please perform your nebulizer treatments and airway clearance therapy in the morning prior to the procedure (if applicable).    Arrive with a responsible adult who can drive you home and stay with you for a minimum of 24 hours. The medications used during the procedure will make you sleepy, so you won't be able to drive yourself home.   You cannot use public transportation, ride-share services, or non-medical taxi services without a responsible caregiver. Medical transport services are okay, but a caregiver must be there to receive you at your destination.  Please check in with your  when you arrive. Drivers should stay on campus.    Expect to be at the procedure center for about 1.5-2.5 hours.    Do not wear jewelry (i.e. earrings, rings, necklaces, watches, etc.). Leave your purse, billfold, credit cards, and other valuables at home.      Bring insurance card and ID.       Answers to Commonly Asked Questions     How soon can I eat after the procedure?  You may resume your normal diet when you feel ready, unless advised otherwise by the doctor performing your procedure. We recommend starting with a light meal.   Do not drink alcohol for 24 hours after your procedure.  You may resume normal activities (work, exercise, etc.) after 24 hours.    How will I feel after the procedure?  It is normal to feel bloated and gassy after your procedure. Walking will help move the air through your colon. You can take non-aspirin pain relievers that contain acetaminophen (Tylenol).  If you are having sedation, we require a responsible adult to  take you home for your safety. The sedation medicines used to relax you during the procedure can impair your judgement and reaction time and make you forgetful and possibly a little unsteady.  Do not drive, make any important decisions, or sign any legal documents for 24 hours after your procedure.    When will I get my test results?  You should have your procedure results and any lab results (if applicable) by letter, MyChart message, or phone call within 2 weeks. If you have any questions, please call the doctor that referred you for the procedure.    How do I know if my colon is cleaned out?   After completing the bowel prep, your bowel movements should be all liquid and yellow. Your bowel movements will look similar to urine in the toilet. If there are pieces of stool (poop) in the toilet, or if you can't see to the bottom of the toilet, please call our office for advice. Call [phone] and ask to speak with a nurse.    Why is the Golytely bowel prep taken in several steps?   The stool is flushed out by a large wave of fluid going through the colon. Just sipping a large volume of the solution will not achieve the desired result. Studies have shown that two smaller waves (or more in some cases) are better than one large one.      What if I need to cancel or reschedule my procedure?  Contact our endoscopy scheduling team at [phone], option 2. Monday through Friday, 7:00am-5:00pm.

## 2024-12-05 NOTE — TELEPHONE ENCOUNTER
Pre visit planning completed.      Procedure details:    Patient scheduled for Colonoscopy on 12/13/24.     Arrival time: 0715. Procedure time 0815    [facility]; [address] [place] [address], 5th Floor, [place] [address]. Check in location: 5th Floor.    Sedation type: Conscious sedation     Pre op exam needed? No.    Indication for procedure: internal hemorrhoid, rectal discomfort      Chart review:     Electronic implanted devices? No    Recent diagnosis of diverticulitis within the last 6 weeks? No      Medication review:    Diabetic? No    Anticoagulants? No    Weight loss medication/injectable? No GLP-1 medication per patient's medication list. Nursing to verify with pre-assessment call.    Other medication HOLDING recommendations:  N/A      Prep for procedure:     Bowel prep recommendation: Extended Golytely. Bowel prep prescription sent to    [id number] - [place], [place] - [address] AT [address]   Due to: constipation noted or reported - constipation noted in office visit from 11/26/24    Prep instructions sent via letter, per scheduling note patient will be setting up [facility]. Letter will be sent at this time.         Denisse Macias RN  Endoscopy Procedure Pre Assessment   [phone] option 2

## 2025-05-15 ENCOUNTER — PATIENT OUTREACH (OUTPATIENT)
Dept: CARE COORDINATION | Facility: CLINIC | Age: 50
End: 2025-05-15
Payer: COMMERCIAL

## (undated) RX ORDER — LIDOCAINE HYDROCHLORIDE 10 MG/ML
INJECTION, SOLUTION EPIDURAL; INFILTRATION; INTRACAUDAL; PERINEURAL
Status: DISPENSED
Start: 2019-06-13